# Patient Record
Sex: MALE | Race: WHITE | NOT HISPANIC OR LATINO | Employment: FULL TIME | URBAN - METROPOLITAN AREA
[De-identification: names, ages, dates, MRNs, and addresses within clinical notes are randomized per-mention and may not be internally consistent; named-entity substitution may affect disease eponyms.]

---

## 2017-02-04 ENCOUNTER — APPOINTMENT (EMERGENCY)
Dept: CT IMAGING | Facility: HOSPITAL | Age: 57
End: 2017-02-04
Payer: COMMERCIAL

## 2017-02-04 ENCOUNTER — HOSPITAL ENCOUNTER (EMERGENCY)
Facility: HOSPITAL | Age: 57
Discharge: HOME | End: 2017-02-04
Admitting: EMERGENCY MEDICINE
Payer: COMMERCIAL

## 2017-02-04 VITALS
WEIGHT: 213 LBS | OXYGEN SATURATION: 95 % | TEMPERATURE: 97.9 F | HEART RATE: 70 BPM | SYSTOLIC BLOOD PRESSURE: 120 MMHG | DIASTOLIC BLOOD PRESSURE: 72 MMHG | HEIGHT: 70 IN | RESPIRATION RATE: 18 BRPM | BODY MASS INDEX: 30.49 KG/M2

## 2017-02-04 DIAGNOSIS — R10.9 ABDOMINAL PAIN: Primary | ICD-10-CM

## 2017-02-04 LAB
ALBUMIN SERPL BCP-MCNC: 4.3 G/DL (ref 3.5–5)
ALP SERPL-CCNC: 55 U/L (ref 46–116)
ALT SERPL W P-5'-P-CCNC: 36 U/L (ref 12–78)
ANION GAP SERPL CALCULATED.3IONS-SCNC: 12 MMOL/L (ref 4–13)
AST SERPL W P-5'-P-CCNC: 19 U/L (ref 5–45)
BASOPHILS # BLD AUTO: 0.04 THOUSANDS/ΜL (ref 0–0.1)
BASOPHILS NFR BLD AUTO: 0 % (ref 0–1)
BILIRUB SERPL-MCNC: 1.2 MG/DL (ref 0.2–1)
BUN SERPL-MCNC: 16 MG/DL (ref 5–25)
CALCIUM SERPL-MCNC: 9.5 MG/DL (ref 8.3–10.1)
CHLORIDE SERPL-SCNC: 97 MMOL/L (ref 100–108)
CO2 SERPL-SCNC: 30 MMOL/L (ref 21–32)
CREAT SERPL-MCNC: 0.96 MG/DL (ref 0.6–1.3)
EOSINOPHIL # BLD AUTO: 0.01 THOUSAND/ΜL (ref 0–0.61)
EOSINOPHIL NFR BLD AUTO: 0 % (ref 0–6)
ERYTHROCYTE [DISTWIDTH] IN BLOOD BY AUTOMATED COUNT: 11.8 % (ref 11.6–15.1)
FLUAV AG SPEC QL IA: NEGATIVE
FLUBV AG SPEC QL IA: NEGATIVE
GFR SERPL CREATININE-BSD FRML MDRD: >60 ML/MIN/1.73SQ M
GLUCOSE SERPL-MCNC: 127 MG/DL (ref 65–140)
HCT VFR BLD AUTO: 46.6 % (ref 36.5–49.3)
HGB BLD-MCNC: 17.3 G/DL (ref 12–17)
LIPASE SERPL-CCNC: 185 U/L (ref 73–393)
LYMPHOCYTES # BLD AUTO: 1.16 THOUSANDS/ΜL (ref 0.6–4.47)
LYMPHOCYTES NFR BLD AUTO: 10 % (ref 14–44)
MCH RBC QN AUTO: 31.1 PG (ref 26.8–34.3)
MCHC RBC AUTO-ENTMCNC: 37.1 G/DL (ref 31.4–37.4)
MCV RBC AUTO: 84 FL (ref 82–98)
MONOCYTES # BLD AUTO: 0.51 THOUSAND/ΜL (ref 0.17–1.22)
MONOCYTES NFR BLD AUTO: 4 % (ref 4–12)
NEUTROPHILS # BLD AUTO: 10.03 THOUSANDS/ΜL (ref 1.85–7.62)
NEUTS SEG NFR BLD AUTO: 85 % (ref 43–75)
NRBC BLD AUTO-RTO: 0 /100 WBCS
PLATELET # BLD AUTO: 258 THOUSANDS/UL (ref 149–390)
PMV BLD AUTO: 9.9 FL (ref 8.9–12.7)
POTASSIUM SERPL-SCNC: 2.6 MMOL/L (ref 3.5–5.3)
PROT SERPL-MCNC: 7.5 G/DL (ref 6.4–8.2)
RBC # BLD AUTO: 5.56 MILLION/UL (ref 3.88–5.62)
SODIUM SERPL-SCNC: 139 MMOL/L (ref 136–145)
WBC # BLD AUTO: 11.79 THOUSAND/UL (ref 4.31–10.16)

## 2017-02-04 PROCEDURE — 83690 ASSAY OF LIPASE: CPT | Performed by: PHYSICIAN ASSISTANT

## 2017-02-04 PROCEDURE — 96374 THER/PROPH/DIAG INJ IV PUSH: CPT

## 2017-02-04 PROCEDURE — 80053 COMPREHEN METABOLIC PANEL: CPT | Performed by: PHYSICIAN ASSISTANT

## 2017-02-04 PROCEDURE — 87798 DETECT AGENT NOS DNA AMP: CPT | Performed by: PHYSICIAN ASSISTANT

## 2017-02-04 PROCEDURE — A9270 NON-COVERED ITEM OR SERVICE: HCPCS | Performed by: PHYSICIAN ASSISTANT

## 2017-02-04 PROCEDURE — 87400 INFLUENZA A/B EACH AG IA: CPT | Performed by: PHYSICIAN ASSISTANT

## 2017-02-04 PROCEDURE — 96361 HYDRATE IV INFUSION ADD-ON: CPT

## 2017-02-04 PROCEDURE — 99284 EMERGENCY DEPT VISIT MOD MDM: CPT

## 2017-02-04 PROCEDURE — 36415 COLL VENOUS BLD VENIPUNCTURE: CPT | Performed by: PHYSICIAN ASSISTANT

## 2017-02-04 PROCEDURE — 93005 ELECTROCARDIOGRAM TRACING: CPT | Performed by: PHYSICIAN ASSISTANT

## 2017-02-04 PROCEDURE — 85025 COMPLETE CBC W/AUTO DIFF WBC: CPT | Performed by: PHYSICIAN ASSISTANT

## 2017-02-04 PROCEDURE — 74177 CT ABD & PELVIS W/CONTRAST: CPT

## 2017-02-04 PROCEDURE — 96375 TX/PRO/DX INJ NEW DRUG ADDON: CPT

## 2017-02-04 RX ORDER — ONDANSETRON 4 MG/1
4 TABLET, ORALLY DISINTEGRATING ORAL EVERY 8 HOURS PRN
Qty: 20 TABLET | Refills: 0 | Status: SHIPPED | OUTPATIENT
Start: 2017-02-04 | End: 2017-12-26

## 2017-02-04 RX ORDER — POTASSIUM CHLORIDE 20 MEQ/1
40 TABLET, EXTENDED RELEASE ORAL ONCE
Status: COMPLETED | OUTPATIENT
Start: 2017-02-04 | End: 2017-02-04

## 2017-02-04 RX ORDER — ALPRAZOLAM 0.5 MG/1
0.5 TABLET ORAL
COMMUNITY
End: 2019-09-12 | Stop reason: SDUPTHER

## 2017-02-04 RX ORDER — POTASSIUM CHLORIDE 20 MEQ/1
20 TABLET, EXTENDED RELEASE ORAL DAILY
Qty: 5 TABLET | Refills: 0 | Status: SHIPPED | OUTPATIENT
Start: 2017-02-04 | End: 2017-06-15 | Stop reason: ALTCHOICE

## 2017-02-04 RX ORDER — LANSOPRAZOLE 30 MG/1
30 CAPSULE, DELAYED RELEASE ORAL DAILY
COMMUNITY
End: 2017-06-15 | Stop reason: ALTCHOICE

## 2017-02-04 RX ORDER — LORAZEPAM 2 MG/ML
1 INJECTION INTRAMUSCULAR ONCE
Status: COMPLETED | OUTPATIENT
Start: 2017-02-04 | End: 2017-02-04

## 2017-02-04 RX ORDER — LORATADINE 10 MG/1
10 TABLET ORAL DAILY
COMMUNITY
End: 2017-06-15 | Stop reason: ALTCHOICE

## 2017-02-04 RX ORDER — KETOROLAC TROMETHAMINE 30 MG/ML
30 INJECTION, SOLUTION INTRAMUSCULAR; INTRAVENOUS ONCE
Status: COMPLETED | OUTPATIENT
Start: 2017-02-04 | End: 2017-02-04

## 2017-02-04 RX ORDER — VALSARTAN AND HYDROCHLOROTHIAZIDE 160; 25 MG/1; MG/1
1 TABLET ORAL DAILY
Status: ON HOLD | COMMUNITY
End: 2018-09-19 | Stop reason: ALTCHOICE

## 2017-02-04 RX ORDER — ONDANSETRON 2 MG/ML
4 INJECTION INTRAMUSCULAR; INTRAVENOUS ONCE
Status: COMPLETED | OUTPATIENT
Start: 2017-02-04 | End: 2017-02-04

## 2017-02-04 RX ADMIN — KETOROLAC TROMETHAMINE 30 MG: 30 INJECTION, SOLUTION INTRAMUSCULAR at 17:59

## 2017-02-04 RX ADMIN — IOHEXOL 100 ML: 350 INJECTION, SOLUTION INTRAVENOUS at 18:42

## 2017-02-04 RX ADMIN — LORAZEPAM 1 MG: 2 INJECTION INTRAMUSCULAR; INTRAVENOUS at 17:59

## 2017-02-04 RX ADMIN — SODIUM CHLORIDE 1000 ML: 0.9 INJECTION, SOLUTION INTRAVENOUS at 17:57

## 2017-02-04 RX ADMIN — POTASSIUM CHLORIDE 40 MEQ: 1500 TABLET, EXTENDED RELEASE ORAL at 19:20

## 2017-02-04 RX ADMIN — Medication 400 MG: at 19:20

## 2017-02-04 RX ADMIN — ONDANSETRON 4 MG: 2 INJECTION INTRAMUSCULAR; INTRAVENOUS at 17:59

## 2017-02-05 LAB
FLUAV AG SPEC QL: NORMAL
FLUBV AG SPEC QL: NORMAL
RSV B RNA SPEC QL NAA+PROBE: NORMAL

## 2017-02-06 LAB
ATRIAL RATE: 59 BPM
P AXIS: 51 DEGREES
PR INTERVAL: 158 MS
QRS AXIS: 22 DEGREES
QRSD INTERVAL: 96 MS
QT INTERVAL: 432 MS
QTC INTERVAL: 427 MS
T WAVE AXIS: 40 DEGREES
VENTRICULAR RATE: 59 BPM

## 2017-02-15 ENCOUNTER — ALLSCRIPTS OFFICE VISIT (OUTPATIENT)
Dept: OTHER | Facility: OTHER | Age: 57
End: 2017-02-15

## 2017-02-28 ENCOUNTER — ANESTHESIA EVENT (OUTPATIENT)
Dept: PERIOP | Facility: HOSPITAL | Age: 57
End: 2017-02-28
Payer: COMMERCIAL

## 2017-03-01 ENCOUNTER — HOSPITAL ENCOUNTER (OUTPATIENT)
Facility: HOSPITAL | Age: 57
Setting detail: OUTPATIENT SURGERY
Discharge: HOME | End: 2017-03-01
Attending: INTERNAL MEDICINE | Admitting: INTERNAL MEDICINE
Payer: COMMERCIAL

## 2017-03-01 ENCOUNTER — ANESTHESIA (OUTPATIENT)
Dept: PERIOP | Facility: HOSPITAL | Age: 57
End: 2017-03-01
Payer: COMMERCIAL

## 2017-03-01 ENCOUNTER — GENERIC CONVERSION - ENCOUNTER (OUTPATIENT)
Dept: OTHER | Facility: OTHER | Age: 57
End: 2017-03-01

## 2017-03-01 VITALS
WEIGHT: 216.93 LBS | DIASTOLIC BLOOD PRESSURE: 73 MMHG | OXYGEN SATURATION: 97 % | HEART RATE: 56 BPM | BODY MASS INDEX: 31.06 KG/M2 | HEIGHT: 70 IN | SYSTOLIC BLOOD PRESSURE: 131 MMHG | RESPIRATION RATE: 20 BRPM | TEMPERATURE: 98.4 F

## 2017-03-01 DIAGNOSIS — R11.0 NAUSEA: ICD-10-CM

## 2017-03-01 DIAGNOSIS — R10.84 GENERALIZED ABDOMINAL PAIN: ICD-10-CM

## 2017-03-01 PROCEDURE — 88305 TISSUE EXAM BY PATHOLOGIST: CPT | Performed by: INTERNAL MEDICINE

## 2017-03-01 PROCEDURE — 88342 IMHCHEM/IMCYTCHM 1ST ANTB: CPT | Performed by: INTERNAL MEDICINE

## 2017-03-01 RX ORDER — PROPOFOL 10 MG/ML
INJECTION, EMULSION INTRAVENOUS AS NEEDED
Status: DISCONTINUED | OUTPATIENT
Start: 2017-03-01 | End: 2017-03-01 | Stop reason: SURG

## 2017-03-01 RX ORDER — SODIUM CHLORIDE, SODIUM LACTATE, POTASSIUM CHLORIDE, CALCIUM CHLORIDE 600; 310; 30; 20 MG/100ML; MG/100ML; MG/100ML; MG/100ML
50 INJECTION, SOLUTION INTRAVENOUS CONTINUOUS
Status: DISCONTINUED | OUTPATIENT
Start: 2017-03-01 | End: 2017-03-01 | Stop reason: HOSPADM

## 2017-03-01 RX ADMIN — SODIUM CHLORIDE, SODIUM LACTATE, POTASSIUM CHLORIDE, AND CALCIUM CHLORIDE 50 ML/HR: .6; .31; .03; .02 INJECTION, SOLUTION INTRAVENOUS at 10:26

## 2017-03-01 RX ADMIN — PROPOFOL 120 MG: 10 INJECTION, EMULSION INTRAVENOUS at 11:12

## 2017-03-01 RX ADMIN — PROPOFOL 50 MG: 10 INJECTION, EMULSION INTRAVENOUS at 11:20

## 2017-03-01 RX ADMIN — PROPOFOL 30 MG: 10 INJECTION, EMULSION INTRAVENOUS at 11:15

## 2017-03-04 ENCOUNTER — GENERIC CONVERSION - ENCOUNTER (OUTPATIENT)
Dept: OTHER | Facility: OTHER | Age: 57
End: 2017-03-04

## 2017-06-15 ENCOUNTER — HOSPITAL ENCOUNTER (OUTPATIENT)
Facility: HOSPITAL | Age: 57
Setting detail: OBSERVATION
Discharge: HOME | End: 2017-06-16
Attending: EMERGENCY MEDICINE | Admitting: FAMILY MEDICINE
Payer: COMMERCIAL

## 2017-06-15 DIAGNOSIS — R11.0 CHRONIC NAUSEA: Primary | ICD-10-CM

## 2017-06-15 DIAGNOSIS — R11.2 INTRACTABLE NAUSEA AND VOMITING: ICD-10-CM

## 2017-06-15 PROBLEM — R11.10 VOMITING: Status: ACTIVE | Noted: 2017-06-15

## 2017-06-15 PROBLEM — R19.7 DIARRHEA: Status: ACTIVE | Noted: 2017-06-15

## 2017-06-15 PROBLEM — D72.829 LEUKOCYTOSIS: Status: ACTIVE | Noted: 2017-06-15

## 2017-06-15 LAB
ALBUMIN SERPL BCP-MCNC: 4.3 G/DL (ref 3.5–5)
ALP SERPL-CCNC: 52 U/L (ref 46–116)
ALT SERPL W P-5'-P-CCNC: 29 U/L (ref 12–78)
ANION GAP SERPL CALCULATED.3IONS-SCNC: 12 MMOL/L (ref 4–13)
AST SERPL W P-5'-P-CCNC: 22 U/L (ref 5–45)
ATRIAL RATE: 57 BPM
BASOPHILS # BLD MANUAL: 0 THOUSAND/UL (ref 0–0.1)
BASOPHILS NFR MAR MANUAL: 0 % (ref 0–1)
BILIRUB SERPL-MCNC: 0.8 MG/DL (ref 0.2–1)
BUN SERPL-MCNC: 14 MG/DL (ref 5–25)
CALCIUM SERPL-MCNC: 9.4 MG/DL (ref 8.3–10.1)
CHLORIDE SERPL-SCNC: 101 MMOL/L (ref 100–108)
CO2 SERPL-SCNC: 26 MMOL/L (ref 21–32)
CREAT SERPL-MCNC: 0.84 MG/DL (ref 0.6–1.3)
EOSINOPHIL # BLD MANUAL: 0 THOUSAND/UL (ref 0–0.4)
EOSINOPHIL NFR BLD MANUAL: 0 % (ref 0–6)
ERYTHROCYTE [DISTWIDTH] IN BLOOD BY AUTOMATED COUNT: 12 % (ref 11.6–15.1)
GFR SERPL CREATININE-BSD FRML MDRD: >60 ML/MIN/1.73SQ M
GLUCOSE SERPL-MCNC: 125 MG/DL (ref 65–140)
HCT VFR BLD AUTO: 45.8 % (ref 36.5–49.3)
HGB BLD-MCNC: 16.8 G/DL (ref 12–17)
LIPASE SERPL-CCNC: 172 U/L (ref 73–393)
LYMPHOCYTES # BLD AUTO: 0.45 THOUSAND/UL (ref 0.6–4.47)
LYMPHOCYTES # BLD AUTO: 3 % (ref 14–44)
MCH RBC QN AUTO: 31.5 PG (ref 26.8–34.3)
MCHC RBC AUTO-ENTMCNC: 36.7 G/DL (ref 31.4–37.4)
MCV RBC AUTO: 86 FL (ref 82–98)
MONOCYTES # BLD AUTO: 0.61 THOUSAND/UL (ref 0–1.22)
MONOCYTES NFR BLD: 4 % (ref 4–12)
NEUTROPHILS # BLD MANUAL: 14.08 THOUSAND/UL (ref 1.85–7.62)
NEUTS SEG NFR BLD AUTO: 93 % (ref 43–75)
NRBC BLD AUTO-RTO: 0 /100 WBCS
P AXIS: 53 DEGREES
PLATELET # BLD AUTO: 226 THOUSANDS/UL (ref 149–390)
PLATELET BLD QL SMEAR: ADEQUATE
PMV BLD AUTO: 10.5 FL (ref 8.9–12.7)
POTASSIUM SERPL-SCNC: 3.4 MMOL/L (ref 3.5–5.3)
PR INTERVAL: 170 MS
PROT SERPL-MCNC: 7.5 G/DL (ref 6.4–8.2)
QRS AXIS: 60 DEGREES
QRSD INTERVAL: 92 MS
QT INTERVAL: 456 MS
QTC INTERVAL: 443 MS
RBC # BLD AUTO: 5.34 MILLION/UL (ref 3.88–5.62)
SODIUM SERPL-SCNC: 139 MMOL/L (ref 136–145)
T WAVE AXIS: 43 DEGREES
TOTAL CELLS COUNTED SPEC: 100
TROPONIN I SERPL-MCNC: <0.02 NG/ML
VENTRICULAR RATE: 57 BPM
WBC # BLD AUTO: 15.14 THOUSAND/UL (ref 4.31–10.16)

## 2017-06-15 PROCEDURE — 85007 BL SMEAR W/DIFF WBC COUNT: CPT | Performed by: EMERGENCY MEDICINE

## 2017-06-15 PROCEDURE — 83690 ASSAY OF LIPASE: CPT | Performed by: EMERGENCY MEDICINE

## 2017-06-15 PROCEDURE — 96374 THER/PROPH/DIAG INJ IV PUSH: CPT

## 2017-06-15 PROCEDURE — 99285 EMERGENCY DEPT VISIT HI MDM: CPT

## 2017-06-15 PROCEDURE — 85027 COMPLETE CBC AUTOMATED: CPT | Performed by: EMERGENCY MEDICINE

## 2017-06-15 PROCEDURE — 83735 ASSAY OF MAGNESIUM: CPT | Performed by: NURSE PRACTITIONER

## 2017-06-15 PROCEDURE — 96361 HYDRATE IV INFUSION ADD-ON: CPT

## 2017-06-15 PROCEDURE — 84484 ASSAY OF TROPONIN QUANT: CPT | Performed by: EMERGENCY MEDICINE

## 2017-06-15 PROCEDURE — 93005 ELECTROCARDIOGRAM TRACING: CPT | Performed by: EMERGENCY MEDICINE

## 2017-06-15 PROCEDURE — 36415 COLL VENOUS BLD VENIPUNCTURE: CPT | Performed by: EMERGENCY MEDICINE

## 2017-06-15 PROCEDURE — C9113 INJ PANTOPRAZOLE SODIUM, VIA: HCPCS | Performed by: NURSE PRACTITIONER

## 2017-06-15 PROCEDURE — 96372 THER/PROPH/DIAG INJ SC/IM: CPT

## 2017-06-15 PROCEDURE — 80053 COMPREHEN METABOLIC PANEL: CPT | Performed by: EMERGENCY MEDICINE

## 2017-06-15 RX ORDER — ONDANSETRON 2 MG/ML
4 INJECTION INTRAMUSCULAR; INTRAVENOUS ONCE
Status: COMPLETED | OUTPATIENT
Start: 2017-06-15 | End: 2017-06-15

## 2017-06-15 RX ORDER — RANITIDINE 150 MG/1
150 CAPSULE ORAL DAILY
Qty: 30 CAPSULE | Refills: 0 | Status: SHIPPED | OUTPATIENT
Start: 2017-06-15 | End: 2017-06-16 | Stop reason: HOSPADM

## 2017-06-15 RX ORDER — ONDANSETRON 4 MG/1
4 TABLET, FILM COATED ORAL EVERY 6 HOURS
Qty: 12 TABLET | Refills: 0 | Status: SHIPPED | OUTPATIENT
Start: 2017-06-15 | End: 2017-12-26

## 2017-06-15 RX ORDER — METOCLOPRAMIDE HYDROCHLORIDE 5 MG/ML
10 INJECTION INTRAMUSCULAR; INTRAVENOUS ONCE
Status: DISCONTINUED | OUTPATIENT
Start: 2017-06-15 | End: 2017-06-15 | Stop reason: ALTCHOICE

## 2017-06-15 RX ORDER — ACETAMINOPHEN 325 MG/1
650 TABLET ORAL EVERY 6 HOURS PRN
Status: DISCONTINUED | OUTPATIENT
Start: 2017-06-15 | End: 2017-06-16 | Stop reason: HOSPADM

## 2017-06-15 RX ORDER — SUCRALFATE ORAL 1 G/10ML
1000 SUSPENSION ORAL EVERY 6 HOURS SCHEDULED
Status: DISCONTINUED | OUTPATIENT
Start: 2017-06-15 | End: 2017-06-16 | Stop reason: HOSPADM

## 2017-06-15 RX ORDER — SUCRALFATE ORAL 1 G/10ML
1 SUSPENSION ORAL 4 TIMES DAILY
Qty: 420 ML | Refills: 0 | Status: SHIPPED | OUTPATIENT
Start: 2017-06-15 | End: 2017-06-16 | Stop reason: HOSPADM

## 2017-06-15 RX ORDER — ONDANSETRON HYDROCHLORIDE 4 MG/5ML
4 SOLUTION ORAL ONCE
Status: DISCONTINUED | OUTPATIENT
Start: 2017-06-15 | End: 2017-06-15

## 2017-06-15 RX ORDER — ALPRAZOLAM 0.5 MG/1
0.5 TABLET ORAL
Status: DISCONTINUED | OUTPATIENT
Start: 2017-06-15 | End: 2017-06-16 | Stop reason: HOSPADM

## 2017-06-15 RX ORDER — ONDANSETRON 2 MG/ML
4 INJECTION INTRAMUSCULAR; INTRAVENOUS EVERY 6 HOURS PRN
Status: DISCONTINUED | OUTPATIENT
Start: 2017-06-15 | End: 2017-06-16 | Stop reason: HOSPADM

## 2017-06-15 RX ORDER — LORATADINE 10 MG/1
10 TABLET ORAL DAILY
Status: DISCONTINUED | OUTPATIENT
Start: 2017-06-16 | End: 2017-06-16 | Stop reason: HOSPADM

## 2017-06-15 RX ORDER — MAGNESIUM HYDROXIDE/ALUMINUM HYDROXICE/SIMETHICONE 120; 1200; 1200 MG/30ML; MG/30ML; MG/30ML
30 SUSPENSION ORAL ONCE
Status: COMPLETED | OUTPATIENT
Start: 2017-06-15 | End: 2017-06-15

## 2017-06-15 RX ORDER — ONDANSETRON 2 MG/ML
4 INJECTION INTRAMUSCULAR; INTRAVENOUS ONCE
Status: DISCONTINUED | OUTPATIENT
Start: 2017-06-15 | End: 2017-06-16 | Stop reason: HOSPADM

## 2017-06-15 RX ORDER — PANTOPRAZOLE SODIUM 40 MG/1
40 INJECTION, POWDER, FOR SOLUTION INTRAVENOUS ONCE
Status: COMPLETED | OUTPATIENT
Start: 2017-06-15 | End: 2017-06-15

## 2017-06-15 RX ORDER — PROMETHAZINE HYDROCHLORIDE 25 MG/ML
25 INJECTION, SOLUTION INTRAMUSCULAR; INTRAVENOUS ONCE
Status: COMPLETED | OUTPATIENT
Start: 2017-06-15 | End: 2017-06-15

## 2017-06-15 RX ORDER — LEVOCETIRIZINE DIHYDROCHLORIDE 5 MG/1
5 TABLET, FILM COATED ORAL EVERY EVENING
COMMUNITY
End: 2017-12-26

## 2017-06-15 RX ORDER — SODIUM CHLORIDE 9 MG/ML
125 INJECTION, SOLUTION INTRAVENOUS CONTINUOUS
Status: DISPENSED | OUTPATIENT
Start: 2017-06-15 | End: 2017-06-16

## 2017-06-15 RX ORDER — OLANZAPINE 10 MG/1
10 INJECTION, POWDER, LYOPHILIZED, FOR SOLUTION INTRAMUSCULAR ONCE
Status: COMPLETED | OUTPATIENT
Start: 2017-06-15 | End: 2017-06-15

## 2017-06-15 RX ADMIN — PROMETHAZINE HYDROCHLORIDE 25 MG: 25 INJECTION INTRAMUSCULAR; INTRAVENOUS at 15:07

## 2017-06-15 RX ADMIN — OLANZAPINE 10 MG: 10 INJECTION, POWDER, LYOPHILIZED, FOR SOLUTION INTRAMUSCULAR at 12:27

## 2017-06-15 RX ADMIN — ALUMINUM HYDROXIDE, MAGNESIUM HYDROXIDE, AND SIMETHICONE 30 ML: 200; 200; 20 SUSPENSION ORAL at 11:59

## 2017-06-15 RX ADMIN — METOCLOPRAMIDE: 5 INJECTION, SOLUTION INTRAMUSCULAR; INTRAVENOUS at 17:22

## 2017-06-15 RX ADMIN — SODIUM CHLORIDE 1000 ML: 0.9 INJECTION, SOLUTION INTRAVENOUS at 11:52

## 2017-06-15 RX ADMIN — SODIUM CHLORIDE 125 ML/HR: 0.9 INJECTION, SOLUTION INTRAVENOUS at 17:38

## 2017-06-15 RX ADMIN — WATER 10 ML: 1 INJECTION INTRAMUSCULAR; INTRAVENOUS; SUBCUTANEOUS at 12:27

## 2017-06-15 RX ADMIN — SUCRALFATE 1000 MG: 1 SUSPENSION ORAL at 17:22

## 2017-06-15 RX ADMIN — ONDANSETRON 4 MG: 2 INJECTION INTRAMUSCULAR; INTRAVENOUS at 11:53

## 2017-06-15 RX ADMIN — SODIUM CHLORIDE 1000 ML: 0.9 INJECTION, SOLUTION INTRAVENOUS at 14:19

## 2017-06-15 RX ADMIN — ONDANSETRON 4 MG: 2 INJECTION INTRAMUSCULAR; INTRAVENOUS at 15:43

## 2017-06-15 RX ADMIN — PANTOPRAZOLE SODIUM 40 MG: 40 INJECTION, POWDER, FOR SOLUTION INTRAVENOUS at 17:22

## 2017-06-15 RX ADMIN — LIDOCAINE HYDROCHLORIDE 15 ML: 20 SOLUTION ORAL; TOPICAL at 11:59

## 2017-06-15 RX ADMIN — SUCRALFATE 1000 MG: 1 SUSPENSION ORAL at 23:54

## 2017-06-15 RX ADMIN — ALPRAZOLAM 0.5 MG: 0.5 TABLET ORAL at 17:22

## 2017-06-16 VITALS
DIASTOLIC BLOOD PRESSURE: 87 MMHG | SYSTOLIC BLOOD PRESSURE: 124 MMHG | HEART RATE: 55 BPM | RESPIRATION RATE: 18 BRPM | BODY MASS INDEX: 30.08 KG/M2 | OXYGEN SATURATION: 98 % | TEMPERATURE: 98.3 F | HEIGHT: 70 IN | WEIGHT: 210.1 LBS

## 2017-06-16 PROBLEM — R19.7 DIARRHEA: Status: RESOLVED | Noted: 2017-06-15 | Resolved: 2017-06-16

## 2017-06-16 PROBLEM — R11.2 INTRACTABLE NAUSEA AND VOMITING: Status: RESOLVED | Noted: 2017-06-15 | Resolved: 2017-06-16

## 2017-06-16 PROBLEM — E87.6 HYPOKALEMIA: Status: ACTIVE | Noted: 2017-06-16

## 2017-06-16 PROBLEM — D72.829 LEUKOCYTOSIS: Status: RESOLVED | Noted: 2017-06-15 | Resolved: 2017-06-16

## 2017-06-16 LAB
ANION GAP SERPL CALCULATED.3IONS-SCNC: 11 MMOL/L (ref 4–13)
ANION GAP SERPL CALCULATED.3IONS-SCNC: 8 MMOL/L (ref 4–13)
BACTERIA UR QL AUTO: ABNORMAL /HPF
BASOPHILS # BLD AUTO: 0.03 THOUSANDS/ΜL (ref 0–0.1)
BASOPHILS NFR BLD AUTO: 0 % (ref 0–1)
BILIRUB UR QL STRIP: NEGATIVE
BUN SERPL-MCNC: 10 MG/DL (ref 5–25)
BUN SERPL-MCNC: 12 MG/DL (ref 5–25)
C DIFF TOX GENS STL QL NAA+PROBE: NORMAL
CALCIUM SERPL-MCNC: 8.1 MG/DL (ref 8.3–10.1)
CALCIUM SERPL-MCNC: 8.8 MG/DL (ref 8.3–10.1)
CHLORIDE SERPL-SCNC: 101 MMOL/L (ref 100–108)
CHLORIDE SERPL-SCNC: 104 MMOL/L (ref 100–108)
CLARITY UR: CLEAR
CO2 SERPL-SCNC: 26 MMOL/L (ref 21–32)
CO2 SERPL-SCNC: 32 MMOL/L (ref 21–32)
COLOR UR: YELLOW
CREAT SERPL-MCNC: 0.86 MG/DL (ref 0.6–1.3)
CREAT SERPL-MCNC: 1.02 MG/DL (ref 0.6–1.3)
EOSINOPHIL # BLD AUTO: 0.08 THOUSAND/ΜL (ref 0–0.61)
EOSINOPHIL NFR BLD AUTO: 1 % (ref 0–6)
ERYTHROCYTE [DISTWIDTH] IN BLOOD BY AUTOMATED COUNT: 12.5 % (ref 11.6–15.1)
GFR SERPL CREATININE-BSD FRML MDRD: >60 ML/MIN/1.73SQ M
GFR SERPL CREATININE-BSD FRML MDRD: >60 ML/MIN/1.73SQ M
GLUCOSE P FAST SERPL-MCNC: 105 MG/DL (ref 65–99)
GLUCOSE P FAST SERPL-MCNC: 143 MG/DL (ref 65–99)
GLUCOSE SERPL-MCNC: 105 MG/DL (ref 65–140)
GLUCOSE SERPL-MCNC: 143 MG/DL (ref 65–140)
GLUCOSE UR STRIP-MCNC: NEGATIVE MG/DL
HCT VFR BLD AUTO: 42 % (ref 36.5–49.3)
HGB BLD-MCNC: 14.9 G/DL (ref 12–17)
HGB UR QL STRIP.AUTO: ABNORMAL
KETONES UR STRIP-MCNC: NEGATIVE MG/DL
LEUKOCYTE ESTERASE UR QL STRIP: NEGATIVE
LYMPHOCYTES # BLD AUTO: 1.74 THOUSANDS/ΜL (ref 0.6–4.47)
LYMPHOCYTES NFR BLD AUTO: 15 % (ref 14–44)
MAGNESIUM SERPL-MCNC: 1.7 MG/DL (ref 1.6–2.6)
MAGNESIUM SERPL-MCNC: 1.8 MG/DL (ref 1.6–2.6)
MAGNESIUM SERPL-MCNC: 1.8 MG/DL (ref 1.6–2.6)
MCH RBC QN AUTO: 30.8 PG (ref 26.8–34.3)
MCHC RBC AUTO-ENTMCNC: 35.5 G/DL (ref 31.4–37.4)
MCV RBC AUTO: 87 FL (ref 82–98)
MONOCYTES # BLD AUTO: 0.78 THOUSAND/ΜL (ref 0.17–1.22)
MONOCYTES NFR BLD AUTO: 7 % (ref 4–12)
NEUTROPHILS # BLD AUTO: 9.29 THOUSANDS/ΜL (ref 1.85–7.62)
NEUTS SEG NFR BLD AUTO: 78 % (ref 43–75)
NITRITE UR QL STRIP: NEGATIVE
NON-SQ EPI CELLS URNS QL MICRO: ABNORMAL /HPF
NRBC BLD AUTO-RTO: 0 /100 WBCS
PH UR STRIP.AUTO: 6 [PH] (ref 4.5–8)
PLATELET # BLD AUTO: 202 THOUSANDS/UL (ref 149–390)
PLATELET # BLD AUTO: 210 THOUSANDS/UL (ref 149–390)
PMV BLD AUTO: 10.3 FL (ref 8.9–12.7)
PMV BLD AUTO: 10.6 FL (ref 8.9–12.7)
POTASSIUM SERPL-SCNC: 2.9 MMOL/L (ref 3.5–5.3)
POTASSIUM SERPL-SCNC: 3 MMOL/L (ref 3.5–5.3)
PROT UR STRIP-MCNC: NEGATIVE MG/DL
RBC # BLD AUTO: 4.83 MILLION/UL (ref 3.88–5.62)
RBC #/AREA URNS AUTO: ABNORMAL /HPF
SODIUM SERPL-SCNC: 141 MMOL/L (ref 136–145)
SODIUM SERPL-SCNC: 141 MMOL/L (ref 136–145)
SP GR UR STRIP.AUTO: 1.02 (ref 1–1.03)
UROBILINOGEN UR QL STRIP.AUTO: 0.2 E.U./DL
WBC # BLD AUTO: 11.96 THOUSAND/UL (ref 4.31–10.16)
WBC #/AREA URNS AUTO: ABNORMAL /HPF

## 2017-06-16 PROCEDURE — 83735 ASSAY OF MAGNESIUM: CPT | Performed by: NURSE PRACTITIONER

## 2017-06-16 PROCEDURE — 85025 COMPLETE CBC W/AUTO DIFF WBC: CPT | Performed by: NURSE PRACTITIONER

## 2017-06-16 PROCEDURE — 81001 URINALYSIS AUTO W/SCOPE: CPT | Performed by: NURSE PRACTITIONER

## 2017-06-16 PROCEDURE — 87015 SPECIMEN INFECT AGNT CONCNTJ: CPT | Performed by: NURSE PRACTITIONER

## 2017-06-16 PROCEDURE — 85049 AUTOMATED PLATELET COUNT: CPT | Performed by: NURSE PRACTITIONER

## 2017-06-16 PROCEDURE — 87209 SMEAR COMPLEX STAIN: CPT | Performed by: NURSE PRACTITIONER

## 2017-06-16 PROCEDURE — 87177 OVA AND PARASITES SMEARS: CPT | Performed by: NURSE PRACTITIONER

## 2017-06-16 PROCEDURE — 87899 AGENT NOS ASSAY W/OPTIC: CPT | Performed by: NURSE PRACTITIONER

## 2017-06-16 PROCEDURE — 87493 C DIFF AMPLIFIED PROBE: CPT | Performed by: NURSE PRACTITIONER

## 2017-06-16 PROCEDURE — 87086 URINE CULTURE/COLONY COUNT: CPT | Performed by: NURSE PRACTITIONER

## 2017-06-16 PROCEDURE — 80048 BASIC METABOLIC PNL TOTAL CA: CPT | Performed by: NURSE PRACTITIONER

## 2017-06-16 PROCEDURE — 87045 FECES CULTURE AEROBIC BACT: CPT | Performed by: NURSE PRACTITIONER

## 2017-06-16 PROCEDURE — 87046 STOOL CULTR AEROBIC BACT EA: CPT | Performed by: NURSE PRACTITIONER

## 2017-06-16 RX ORDER — PANTOPRAZOLE SODIUM 40 MG/1
40 TABLET, DELAYED RELEASE ORAL DAILY
Qty: 30 TABLET | Refills: 0 | Status: SHIPPED | OUTPATIENT
Start: 2017-06-16 | End: 2017-06-16

## 2017-06-16 RX ORDER — SUCRALFATE ORAL 1 G/10ML
1 SUSPENSION ORAL 2 TIMES DAILY
Qty: 420 ML | Refills: 0 | Status: SHIPPED | OUTPATIENT
Start: 2017-06-16 | End: 2017-12-26

## 2017-06-16 RX ORDER — PANTOPRAZOLE SODIUM 40 MG/1
40 TABLET, DELAYED RELEASE ORAL 2 TIMES DAILY
Qty: 30 TABLET | Refills: 0 | Status: SHIPPED | OUTPATIENT
Start: 2017-06-16 | End: 2017-12-26

## 2017-06-16 RX ORDER — POTASSIUM CHLORIDE 14.9 MG/ML
20 INJECTION INTRAVENOUS ONCE
Status: COMPLETED | OUTPATIENT
Start: 2017-06-16 | End: 2017-06-16

## 2017-06-16 RX ORDER — SUCRALFATE ORAL 1 G/10ML
1 SUSPENSION ORAL 2 TIMES DAILY
Qty: 420 ML | Refills: 0 | Status: SHIPPED | OUTPATIENT
Start: 2017-06-16 | End: 2017-06-16

## 2017-06-16 RX ORDER — POTASSIUM CHLORIDE 14.9 MG/ML
20 INJECTION INTRAVENOUS
Status: COMPLETED | OUTPATIENT
Start: 2017-06-16 | End: 2017-06-16

## 2017-06-16 RX ORDER — POTASSIUM CHLORIDE 20 MEQ/1
40 TABLET, EXTENDED RELEASE ORAL ONCE
Status: COMPLETED | OUTPATIENT
Start: 2017-06-16 | End: 2017-06-16

## 2017-06-16 RX ORDER — PANTOPRAZOLE SODIUM 40 MG/1
40 TABLET, DELAYED RELEASE ORAL
Status: DISCONTINUED | OUTPATIENT
Start: 2017-06-16 | End: 2017-06-16 | Stop reason: HOSPADM

## 2017-06-16 RX ORDER — POTASSIUM CHLORIDE 29.8 MG/ML
40 INJECTION INTRAVENOUS ONCE
Status: DISCONTINUED | OUTPATIENT
Start: 2017-06-16 | End: 2017-06-16

## 2017-06-16 RX ADMIN — SODIUM CHLORIDE 125 ML/HR: 0.9 INJECTION, SOLUTION INTRAVENOUS at 01:37

## 2017-06-16 RX ADMIN — LORATADINE 10 MG: 10 TABLET ORAL at 08:30

## 2017-06-16 RX ADMIN — POTASSIUM CHLORIDE 40 MEQ: 1500 TABLET, EXTENDED RELEASE ORAL at 18:24

## 2017-06-16 RX ADMIN — SUCRALFATE 1000 MG: 1 SUSPENSION ORAL at 12:11

## 2017-06-16 RX ADMIN — POTASSIUM CHLORIDE 20 MEQ: 200 INJECTION, SOLUTION INTRAVENOUS at 14:19

## 2017-06-16 RX ADMIN — ALPRAZOLAM 0.5 MG: 0.5 TABLET ORAL at 13:48

## 2017-06-16 RX ADMIN — SODIUM CHLORIDE 125 ML/HR: 0.9 INJECTION, SOLUTION INTRAVENOUS at 10:30

## 2017-06-16 RX ADMIN — PANTOPRAZOLE SODIUM 40 MG: 40 TABLET, DELAYED RELEASE ORAL at 17:17

## 2017-06-16 RX ADMIN — POTASSIUM CHLORIDE 20 MEQ: 200 INJECTION, SOLUTION INTRAVENOUS at 12:12

## 2017-06-16 RX ADMIN — HYDROCHLOROTHIAZIDE: 25 TABLET ORAL at 08:30

## 2017-06-16 RX ADMIN — SUCRALFATE 1000 MG: 1 SUSPENSION ORAL at 17:17

## 2017-06-16 RX ADMIN — POTASSIUM CHLORIDE 20 MEQ: 200 INJECTION, SOLUTION INTRAVENOUS at 18:24

## 2017-06-16 RX ADMIN — ENOXAPARIN SODIUM 40 MG: 40 INJECTION SUBCUTANEOUS at 08:29

## 2017-06-16 RX ADMIN — SUCRALFATE 1000 MG: 1 SUSPENSION ORAL at 05:27

## 2017-06-17 ENCOUNTER — HOSPITAL ENCOUNTER (EMERGENCY)
Facility: HOSPITAL | Age: 57
Discharge: HOME | End: 2017-06-17
Attending: EMERGENCY MEDICINE | Admitting: EMERGENCY MEDICINE
Payer: COMMERCIAL

## 2017-06-17 ENCOUNTER — APPOINTMENT (EMERGENCY)
Dept: CT IMAGING | Facility: HOSPITAL | Age: 57
End: 2017-06-17
Payer: COMMERCIAL

## 2017-06-17 VITALS
TEMPERATURE: 99 F | BODY MASS INDEX: 30.06 KG/M2 | WEIGHT: 210 LBS | HEART RATE: 65 BPM | OXYGEN SATURATION: 98 % | RESPIRATION RATE: 18 BRPM | SYSTOLIC BLOOD PRESSURE: 174 MMHG | DIASTOLIC BLOOD PRESSURE: 78 MMHG | HEIGHT: 70 IN

## 2017-06-17 DIAGNOSIS — R11.2 NAUSEA AND VOMITING: Primary | ICD-10-CM

## 2017-06-17 LAB
ALBUMIN SERPL BCP-MCNC: 4.1 G/DL (ref 3.5–5)
ALP SERPL-CCNC: 51 U/L (ref 46–116)
ALT SERPL W P-5'-P-CCNC: 31 U/L (ref 12–78)
ANION GAP SERPL CALCULATED.3IONS-SCNC: 12 MMOL/L (ref 4–13)
AST SERPL W P-5'-P-CCNC: 33 U/L (ref 5–45)
BACTERIA UR CULT: NORMAL
BASOPHILS # BLD AUTO: 0.03 THOUSANDS/ΜL (ref 0–0.1)
BASOPHILS NFR BLD AUTO: 0 % (ref 0–1)
BILIRUB SERPL-MCNC: 1.2 MG/DL (ref 0.2–1)
BILIRUB UR QL STRIP: NEGATIVE
BUN SERPL-MCNC: 10 MG/DL (ref 5–25)
CALCIUM SERPL-MCNC: 9.6 MG/DL (ref 8.3–10.1)
CHLORIDE SERPL-SCNC: 100 MMOL/L (ref 100–108)
CLARITY UR: CLEAR
CO2 SERPL-SCNC: 26 MMOL/L (ref 21–32)
COLOR UR: YELLOW
CREAT SERPL-MCNC: 0.85 MG/DL (ref 0.6–1.3)
EOSINOPHIL # BLD AUTO: 0.02 THOUSAND/ΜL (ref 0–0.61)
EOSINOPHIL NFR BLD AUTO: 0 % (ref 0–6)
ERYTHROCYTE [DISTWIDTH] IN BLOOD BY AUTOMATED COUNT: 12.3 % (ref 11.6–15.1)
GFR SERPL CREATININE-BSD FRML MDRD: >60 ML/MIN/1.73SQ M
GLUCOSE SERPL-MCNC: 120 MG/DL (ref 65–140)
GLUCOSE UR STRIP-MCNC: NEGATIVE MG/DL
HCT VFR BLD AUTO: 45.8 % (ref 36.5–49.3)
HGB BLD-MCNC: 16.6 G/DL (ref 12–17)
HGB UR QL STRIP.AUTO: NEGATIVE
KETONES UR STRIP-MCNC: NEGATIVE MG/DL
LEUKOCYTE ESTERASE UR QL STRIP: NEGATIVE
LIPASE SERPL-CCNC: 238 U/L (ref 73–393)
LYMPHOCYTES # BLD AUTO: 1.38 THOUSANDS/ΜL (ref 0.6–4.47)
LYMPHOCYTES NFR BLD AUTO: 13 % (ref 14–44)
MCH RBC QN AUTO: 30.7 PG (ref 26.8–34.3)
MCHC RBC AUTO-ENTMCNC: 36.2 G/DL (ref 31.4–37.4)
MCV RBC AUTO: 85 FL (ref 82–98)
MONOCYTES # BLD AUTO: 0.46 THOUSAND/ΜL (ref 0.17–1.22)
MONOCYTES NFR BLD AUTO: 4 % (ref 4–12)
NEUTROPHILS # BLD AUTO: 8.55 THOUSANDS/ΜL (ref 1.85–7.62)
NEUTS SEG NFR BLD AUTO: 83 % (ref 43–75)
NITRITE UR QL STRIP: NEGATIVE
NRBC BLD AUTO-RTO: 0 /100 WBCS
PH UR STRIP.AUTO: 7.5 [PH] (ref 4.5–8)
PLATELET # BLD AUTO: 214 THOUSANDS/UL (ref 149–390)
PMV BLD AUTO: 10.6 FL (ref 8.9–12.7)
POTASSIUM SERPL-SCNC: 3.6 MMOL/L (ref 3.5–5.3)
PROT SERPL-MCNC: 7.5 G/DL (ref 6.4–8.2)
PROT UR STRIP-MCNC: NEGATIVE MG/DL
RBC # BLD AUTO: 5.4 MILLION/UL (ref 3.88–5.62)
SODIUM SERPL-SCNC: 138 MMOL/L (ref 136–145)
SP GR UR STRIP.AUTO: 1.01 (ref 1–1.03)
UROBILINOGEN UR QL STRIP.AUTO: 0.2 E.U./DL
WBC # BLD AUTO: 10.47 THOUSAND/UL (ref 4.31–10.16)

## 2017-06-17 PROCEDURE — 83690 ASSAY OF LIPASE: CPT | Performed by: EMERGENCY MEDICINE

## 2017-06-17 PROCEDURE — 99284 EMERGENCY DEPT VISIT MOD MDM: CPT

## 2017-06-17 PROCEDURE — 96374 THER/PROPH/DIAG INJ IV PUSH: CPT

## 2017-06-17 PROCEDURE — 80053 COMPREHEN METABOLIC PANEL: CPT | Performed by: EMERGENCY MEDICINE

## 2017-06-17 PROCEDURE — 96361 HYDRATE IV INFUSION ADD-ON: CPT

## 2017-06-17 PROCEDURE — 74177 CT ABD & PELVIS W/CONTRAST: CPT

## 2017-06-17 PROCEDURE — 36415 COLL VENOUS BLD VENIPUNCTURE: CPT | Performed by: EMERGENCY MEDICINE

## 2017-06-17 PROCEDURE — 93005 ELECTROCARDIOGRAM TRACING: CPT | Performed by: EMERGENCY MEDICINE

## 2017-06-17 PROCEDURE — 81003 URINALYSIS AUTO W/O SCOPE: CPT | Performed by: EMERGENCY MEDICINE

## 2017-06-17 PROCEDURE — 85025 COMPLETE CBC W/AUTO DIFF WBC: CPT | Performed by: EMERGENCY MEDICINE

## 2017-06-17 PROCEDURE — 96375 TX/PRO/DX INJ NEW DRUG ADDON: CPT

## 2017-06-17 RX ORDER — SUCRALFATE ORAL 1 G/10ML
1000 SUSPENSION ORAL ONCE
Status: COMPLETED | OUTPATIENT
Start: 2017-06-17 | End: 2017-06-17

## 2017-06-17 RX ORDER — DIPHENHYDRAMINE HYDROCHLORIDE 50 MG/ML
12.5 INJECTION INTRAMUSCULAR; INTRAVENOUS ONCE
Status: COMPLETED | OUTPATIENT
Start: 2017-06-17 | End: 2017-06-17

## 2017-06-17 RX ORDER — METOCLOPRAMIDE HYDROCHLORIDE 5 MG/ML
10 INJECTION INTRAMUSCULAR; INTRAVENOUS ONCE
Status: COMPLETED | OUTPATIENT
Start: 2017-06-17 | End: 2017-06-17

## 2017-06-17 RX ORDER — METOCLOPRAMIDE 10 MG/1
10 TABLET ORAL EVERY 8 HOURS PRN
Qty: 15 TABLET | Refills: 0 | Status: ON HOLD | OUTPATIENT
Start: 2017-06-17 | End: 2018-09-19 | Stop reason: ALTCHOICE

## 2017-06-17 RX ORDER — LORAZEPAM 2 MG/ML
0.5 INJECTION INTRAMUSCULAR ONCE
Status: COMPLETED | OUTPATIENT
Start: 2017-06-17 | End: 2017-06-17

## 2017-06-17 RX ADMIN — SUCRALFATE 1000 MG: 1 SUSPENSION ORAL at 12:38

## 2017-06-17 RX ADMIN — DIPHENHYDRAMINE HYDROCHLORIDE 12.5 MG: 50 INJECTION, SOLUTION INTRAMUSCULAR; INTRAVENOUS at 11:26

## 2017-06-17 RX ADMIN — METOCLOPRAMIDE 10 MG: 5 INJECTION, SOLUTION INTRAMUSCULAR; INTRAVENOUS at 11:26

## 2017-06-17 RX ADMIN — SODIUM CHLORIDE 1000 ML: 0.9 INJECTION, SOLUTION INTRAVENOUS at 11:26

## 2017-06-17 RX ADMIN — LORAZEPAM 0.5 MG: 2 INJECTION INTRAMUSCULAR; INTRAVENOUS at 11:27

## 2017-06-17 RX ADMIN — IOHEXOL 100 ML: 350 INJECTION, SOLUTION INTRAVENOUS at 11:42

## 2017-06-18 LAB
BACTERIA STL CULT: NORMAL
BACTERIA STL CULT: NORMAL

## 2017-06-19 LAB
ATRIAL RATE: 59 BPM
P AXIS: 56 DEGREES
PR INTERVAL: 146 MS
QRS AXIS: 40 DEGREES
QRSD INTERVAL: 92 MS
QT INTERVAL: 458 MS
QTC INTERVAL: 453 MS
T WAVE AXIS: 54 DEGREES
VENTRICULAR RATE: 59 BPM

## 2017-06-20 LAB — O+P STL CONC: NORMAL

## 2017-12-26 ENCOUNTER — HOSPITAL ENCOUNTER (EMERGENCY)
Facility: HOSPITAL | Age: 57
Discharge: HOME | End: 2017-12-26
Attending: EMERGENCY MEDICINE | Admitting: EMERGENCY MEDICINE
Payer: COMMERCIAL

## 2017-12-26 ENCOUNTER — ALLSCRIPTS OFFICE VISIT (OUTPATIENT)
Dept: OTHER | Facility: OTHER | Age: 57
End: 2017-12-26

## 2017-12-26 ENCOUNTER — APPOINTMENT (EMERGENCY)
Dept: CT IMAGING | Facility: HOSPITAL | Age: 57
End: 2017-12-26
Payer: COMMERCIAL

## 2017-12-26 VITALS
TEMPERATURE: 98.6 F | DIASTOLIC BLOOD PRESSURE: 82 MMHG | OXYGEN SATURATION: 95 % | HEART RATE: 59 BPM | BODY MASS INDEX: 28.63 KG/M2 | HEIGHT: 70 IN | WEIGHT: 200 LBS | RESPIRATION RATE: 18 BRPM | SYSTOLIC BLOOD PRESSURE: 146 MMHG

## 2017-12-26 DIAGNOSIS — R07.9 CHEST PAIN: ICD-10-CM

## 2017-12-26 DIAGNOSIS — R10.84 GENERALIZED ABDOMINAL PAIN: ICD-10-CM

## 2017-12-26 DIAGNOSIS — R10.9 ABDOMINAL PAIN: Primary | ICD-10-CM

## 2017-12-26 DIAGNOSIS — I10 ESSENTIAL (PRIMARY) HYPERTENSION: ICD-10-CM

## 2017-12-26 DIAGNOSIS — R11.15 CYCLICAL VOMITING, NOT INTRACTABLE: ICD-10-CM

## 2017-12-26 LAB
ALBUMIN SERPL BCP-MCNC: 4.3 G/DL (ref 3.5–5)
ALP SERPL-CCNC: 48 U/L (ref 46–116)
ALT SERPL W P-5'-P-CCNC: 28 U/L (ref 12–78)
ANION GAP SERPL CALCULATED.3IONS-SCNC: 7 MMOL/L (ref 4–13)
AST SERPL W P-5'-P-CCNC: 13 U/L (ref 5–45)
ATRIAL RATE: 62 BPM
BACTERIA UR QL AUTO: ABNORMAL /HPF
BASOPHILS # BLD AUTO: 0.04 THOUSANDS/ΜL (ref 0–0.1)
BASOPHILS NFR BLD AUTO: 1 % (ref 0–1)
BILIRUB SERPL-MCNC: 0.6 MG/DL (ref 0.2–1)
BILIRUB UR QL STRIP: NEGATIVE
BUN SERPL-MCNC: 19 MG/DL (ref 5–25)
CALCIUM SERPL-MCNC: 9.6 MG/DL (ref 8.3–10.1)
CHLORIDE SERPL-SCNC: 99 MMOL/L (ref 100–108)
CLARITY UR: CLEAR
CO2 SERPL-SCNC: 33 MMOL/L (ref 21–32)
COLOR UR: YELLOW
CREAT SERPL-MCNC: 0.96 MG/DL (ref 0.6–1.3)
EOSINOPHIL # BLD AUTO: 0.15 THOUSAND/ΜL (ref 0–0.61)
EOSINOPHIL NFR BLD AUTO: 2 % (ref 0–6)
ERYTHROCYTE [DISTWIDTH] IN BLOOD BY AUTOMATED COUNT: 12.5 % (ref 11.6–15.1)
GFR SERPL CREATININE-BSD FRML MDRD: 87 ML/MIN/1.73SQ M
GLUCOSE SERPL-MCNC: 119 MG/DL (ref 65–140)
GLUCOSE UR STRIP-MCNC: NEGATIVE MG/DL
HCT VFR BLD AUTO: 46.7 % (ref 36.5–49.3)
HGB BLD-MCNC: 16.4 G/DL (ref 12–17)
HGB UR QL STRIP.AUTO: ABNORMAL
KETONES UR STRIP-MCNC: NEGATIVE MG/DL
LEUKOCYTE ESTERASE UR QL STRIP: NEGATIVE
LIPASE SERPL-CCNC: 245 U/L (ref 73–393)
LYMPHOCYTES # BLD AUTO: 1.14 THOUSANDS/ΜL (ref 0.6–4.47)
LYMPHOCYTES NFR BLD AUTO: 16 % (ref 14–44)
MCH RBC QN AUTO: 30.7 PG (ref 26.8–34.3)
MCHC RBC AUTO-ENTMCNC: 35.1 G/DL (ref 31.4–37.4)
MCV RBC AUTO: 88 FL (ref 82–98)
MONOCYTES # BLD AUTO: 0.6 THOUSAND/ΜL (ref 0.17–1.22)
MONOCYTES NFR BLD AUTO: 9 % (ref 4–12)
NEUTROPHILS # BLD AUTO: 5.05 THOUSANDS/ΜL (ref 1.85–7.62)
NEUTS SEG NFR BLD AUTO: 72 % (ref 43–75)
NITRITE UR QL STRIP: NEGATIVE
NON-SQ EPI CELLS URNS QL MICRO: ABNORMAL /HPF
NRBC BLD AUTO-RTO: 0 /100 WBCS
P AXIS: 45 DEGREES
PH UR STRIP.AUTO: 7.5 [PH] (ref 4.5–8)
PLATELET # BLD AUTO: 238 THOUSANDS/UL (ref 149–390)
PMV BLD AUTO: 10.2 FL (ref 8.9–12.7)
POTASSIUM SERPL-SCNC: 3.1 MMOL/L (ref 3.5–5.3)
PR INTERVAL: 156 MS
PROT SERPL-MCNC: 7.4 G/DL (ref 6.4–8.2)
PROT UR STRIP-MCNC: NEGATIVE MG/DL
QRS AXIS: 55 DEGREES
QRSD INTERVAL: 94 MS
QT INTERVAL: 450 MS
QTC INTERVAL: 456 MS
RBC # BLD AUTO: 5.34 MILLION/UL (ref 3.88–5.62)
RBC #/AREA URNS AUTO: ABNORMAL /HPF
SODIUM SERPL-SCNC: 139 MMOL/L (ref 136–145)
SP GR UR STRIP.AUTO: 1.01 (ref 1–1.03)
T WAVE AXIS: 59 DEGREES
TROPONIN I SERPL-MCNC: <0.02 NG/ML
UROBILINOGEN UR QL STRIP.AUTO: 0.2 E.U./DL
VENTRICULAR RATE: 62 BPM
WBC # BLD AUTO: 7 THOUSAND/UL (ref 4.31–10.16)
WBC #/AREA URNS AUTO: ABNORMAL /HPF

## 2017-12-26 PROCEDURE — 83690 ASSAY OF LIPASE: CPT | Performed by: EMERGENCY MEDICINE

## 2017-12-26 PROCEDURE — 93005 ELECTROCARDIOGRAM TRACING: CPT

## 2017-12-26 PROCEDURE — 96375 TX/PRO/DX INJ NEW DRUG ADDON: CPT

## 2017-12-26 PROCEDURE — 93005 ELECTROCARDIOGRAM TRACING: CPT | Performed by: EMERGENCY MEDICINE

## 2017-12-26 PROCEDURE — 36415 COLL VENOUS BLD VENIPUNCTURE: CPT | Performed by: EMERGENCY MEDICINE

## 2017-12-26 PROCEDURE — 80053 COMPREHEN METABOLIC PANEL: CPT | Performed by: EMERGENCY MEDICINE

## 2017-12-26 PROCEDURE — 84484 ASSAY OF TROPONIN QUANT: CPT | Performed by: EMERGENCY MEDICINE

## 2017-12-26 PROCEDURE — 96361 HYDRATE IV INFUSION ADD-ON: CPT

## 2017-12-26 PROCEDURE — 99284 EMERGENCY DEPT VISIT MOD MDM: CPT

## 2017-12-26 PROCEDURE — 96374 THER/PROPH/DIAG INJ IV PUSH: CPT

## 2017-12-26 PROCEDURE — 85025 COMPLETE CBC W/AUTO DIFF WBC: CPT | Performed by: EMERGENCY MEDICINE

## 2017-12-26 PROCEDURE — 74177 CT ABD & PELVIS W/CONTRAST: CPT

## 2017-12-26 PROCEDURE — 81001 URINALYSIS AUTO W/SCOPE: CPT | Performed by: EMERGENCY MEDICINE

## 2017-12-26 RX ORDER — OLANZAPINE 10 MG/1
10 TABLET, ORALLY DISINTEGRATING ORAL ONCE
Status: COMPLETED | OUTPATIENT
Start: 2017-12-26 | End: 2017-12-26

## 2017-12-26 RX ORDER — ONDANSETRON 2 MG/ML
4 INJECTION INTRAMUSCULAR; INTRAVENOUS ONCE
Status: COMPLETED | OUTPATIENT
Start: 2017-12-26 | End: 2017-12-26

## 2017-12-26 RX ORDER — POTASSIUM CHLORIDE 20 MEQ/1
40 TABLET, EXTENDED RELEASE ORAL ONCE
Status: COMPLETED | OUTPATIENT
Start: 2017-12-26 | End: 2017-12-26

## 2017-12-26 RX ORDER — METOCLOPRAMIDE HYDROCHLORIDE 5 MG/ML
10 INJECTION INTRAMUSCULAR; INTRAVENOUS ONCE
Status: COMPLETED | OUTPATIENT
Start: 2017-12-26 | End: 2017-12-26

## 2017-12-26 RX ORDER — ACETAMINOPHEN 325 MG/1
975 TABLET ORAL ONCE
Status: COMPLETED | OUTPATIENT
Start: 2017-12-26 | End: 2017-12-26

## 2017-12-26 RX ORDER — DIPHENHYDRAMINE HYDROCHLORIDE 50 MG/ML
50 INJECTION INTRAMUSCULAR; INTRAVENOUS ONCE
Status: COMPLETED | OUTPATIENT
Start: 2017-12-26 | End: 2017-12-26

## 2017-12-26 RX ORDER — LANSOPRAZOLE 30 MG/1
30 CAPSULE, DELAYED RELEASE ORAL DAILY
COMMUNITY
End: 2018-06-05 | Stop reason: SDUPTHER

## 2017-12-26 RX ORDER — KETOROLAC TROMETHAMINE 30 MG/ML
15 INJECTION, SOLUTION INTRAMUSCULAR; INTRAVENOUS ONCE
Status: COMPLETED | OUTPATIENT
Start: 2017-12-26 | End: 2017-12-26

## 2017-12-26 RX ADMIN — IOHEXOL 100 ML: 350 INJECTION, SOLUTION INTRAVENOUS at 08:18

## 2017-12-26 RX ADMIN — ACETAMINOPHEN 975 MG: 325 TABLET ORAL at 08:28

## 2017-12-26 RX ADMIN — METOCLOPRAMIDE 10 MG: 5 INJECTION, SOLUTION INTRAMUSCULAR; INTRAVENOUS at 08:09

## 2017-12-26 RX ADMIN — OLANZAPINE 10 MG: 10 TABLET, ORALLY DISINTEGRATING ORAL at 10:37

## 2017-12-26 RX ADMIN — SODIUM CHLORIDE 1000 ML: 0.9 INJECTION, SOLUTION INTRAVENOUS at 07:47

## 2017-12-26 RX ADMIN — KETOROLAC TROMETHAMINE 15 MG: 30 INJECTION, SOLUTION INTRAMUSCULAR at 08:10

## 2017-12-26 RX ADMIN — ONDANSETRON 4 MG: 2 INJECTION INTRAMUSCULAR; INTRAVENOUS at 08:11

## 2017-12-26 RX ADMIN — DIPHENHYDRAMINE HYDROCHLORIDE 50 MG: 50 INJECTION, SOLUTION INTRAMUSCULAR; INTRAVENOUS at 08:26

## 2017-12-26 RX ADMIN — POTASSIUM CHLORIDE 40 MEQ: 1500 TABLET, EXTENDED RELEASE ORAL at 09:18

## 2017-12-26 NOTE — ED PROVIDER NOTES
History  Chief Complaint   Patient presents with    Abdominal Pain     Pt has had intermittand lower abdominal pain, vomitting and mild diarrhea since Thursday  Pt reports pain and vomitting begins after bowel movemtns which start out firm  Pain travels around to flank area at times  Pt report UTi 1 month ago but treatment complete  Patient is a 59-year-old male with history of multiple abdominal surgeries who reports to the emergency department with nausea, vomiting that started several days ago, this waxes and wanes  He does have diffuse abdominal pain  He also notes that earlier he had some left-sided abdominal pain that was associated with the nausea, it was somewhat sharp and he was concerned for kidney stones  No chest pain or shortness of breath  No fevers, chills  He is passing gas and passing stool still although obstruction is still on the differential   He does have diffuse abdominal tenderness with no specific pinpoint tenderness  Normal heart rate, normal heart and lung exam   Medical decision makin-year-old male, abdominal tenderness, multiple abdominal surgeries, will get CT scan, treat his symptoms  Prior to Admission Medications   Prescriptions Last Dose Informant Patient Reported? Taking?    ALPRAZolam (XANAX) 0 5 mg tablet   Yes Yes   Sig: Take 0 5 mg by mouth daily at bedtime as needed for anxiety   lansoprazole (PREVACID) 30 mg capsule   Yes Yes   Sig: Take 30 mg by mouth daily   metoclopramide (REGLAN) 10 mg tablet   No Yes   Sig: Take 1 tablet by mouth every 8 (eight) hours as needed (nausea) for up to 15 doses   valsartan-hydrochlorothiazide (DIOVAN-HCT) 160-25 MG per tablet   Yes Yes   Sig: Take 1 tablet by mouth daily      Facility-Administered Medications: None       Past Medical History:   Diagnosis Date    Anxiety     Diverticulitis     GERD (gastroesophageal reflux disease)     Hypertension        Past Surgical History:   Procedure Laterality Date    CHOLECYSTECTOMY      COLECTOMY      partial    KNEE ARTHROSCOPY      B/L    MO ESOPHAGOGASTRODUODENOSCOPY TRANSORAL DIAGNOSTIC N/A 3/1/2017    Procedure: EGD AND COLONOSCOPY;  Surgeon: Yue Parada MD;  Location: [facility]; Service: Gastroenterology       History reviewed  No pertinent family history  I have reviewed and agree with the history as documented  Social History   Substance Use Topics    Smoking status: Former Smoker     Quit date: 2003    Smokeless tobacco: Never Used    Alcohol use Yes      Comment: rarely        Review of Systems   Constitutional: Negative for fever  Respiratory: Negative for chest tightness and shortness of breath  Cardiovascular: Negative for chest pain and palpitations  Gastrointestinal: Positive for nausea and vomiting  Negative for abdominal pain  All other systems reviewed and are negative  Physical Exam  ED Triage Vitals   Temperature Pulse Respirations Blood Pressure SpO2   12/26/17 0739 12/26/17 0739 12/26/17 0739 12/26/17 0739 12/26/17 0739   98 6 °F (37 °C) 65 20 167/96 100 %      Temp Source Heart Rate Source Patient Position - Orthostatic VS BP Location FiO2 (%)   12/26/17 0739 12/26/17 0739 12/26/17 0830 12/26/17 0830 --   Oral Monitor Lying Left arm       Pain Score       12/26/17 0739       No Pain           Orthostatic Vital Signs  Vitals:    12/26/17 0739 12/26/17 0830 12/26/17 1000 12/26/17 1030   BP: 167/96 (!) 199/89 (!) 181/89 146/82   Pulse: 65 60 62 59   Patient Position - Orthostatic VS:  Lying Lying Lying       Physical Exam   Constitutional: He is oriented to person, place, and time  He appears well-developed and well-nourished  HENT:   Head: Normocephalic and atraumatic  Eyes: EOM are normal  Pupils are equal, round, and reactive to light  Neck: Normal range of motion  Neck supple  Cardiovascular: Normal rate, regular rhythm and normal heart sounds  No murmur heard    Pulmonary/Chest: Effort normal and breath sounds normal  No respiratory distress  He has no wheezes  Abdominal: Soft  Bowel sounds are normal  He exhibits no distension  There is tenderness  Musculoskeletal: Normal range of motion  He exhibits no edema or tenderness  Neurological: He is alert and oriented to person, place, and time  No cranial nerve deficit  Coordination normal    Skin: Skin is warm and dry  He is not diaphoretic  No erythema  Psychiatric: He has a normal mood and affect  His behavior is normal    Nursing note and vitals reviewed        ED Medications  Medications   sodium chloride 0 9 % bolus 1,000 mL (0 mL Intravenous Stopped 12/26/17 0920)   ketorolac (TORADOL) injection 15 mg (15 mg Intravenous Given 12/26/17 0810)   ondansetron (ZOFRAN) injection 4 mg (4 mg Intravenous Given 12/26/17 0811)   metoclopramide (REGLAN) injection 10 mg (10 mg Intravenous Given 12/26/17 0809)   acetaminophen (TYLENOL) tablet 975 mg (975 mg Oral Given 12/26/17 0828)   iohexol (OMNIPAQUE) 350 MG/ML injection (MULTI-DOSE) 100 mL (100 mL Intravenous Given 12/26/17 0818)   diphenhydrAMINE (BENADRYL) injection 50 mg (50 mg Intravenous Given 12/26/17 0826)   potassium chloride (K-DUR,KLOR-CON) CR tablet 40 mEq (40 mEq Oral Given 12/26/17 0918)   OLANZapine (ZyPREXA ZYDIS) dispersible tablet 10 mg (10 mg Oral Given 12/26/17 1037)       Diagnostic Studies  Results Reviewed     Procedure Component Value Units Date/Time    Urine Microscopic [[id number]]  (Abnormal) Collected:  12/26/17 0919    Lab Status:  Final result Specimen:  Urine from Urine, Clean Catch Updated:  12/26/17 0937     RBC, UA 0-1 (A) /hpf      WBC, UA None Seen /hpf      Epithelial Cells Occasional /hpf      Bacteria, UA None Seen /hpf     UA w Reflex to Microscopic w Reflex to Culture [[id number]]  (Abnormal) Collected:  12/26/17 0919    Lab Status:  Final result Specimen:  Urine from Urine, Clean Catch Updated:  12/26/17 0929     Color, UA Yellow     Clarity, UA Clear     Specific [place], UA 1 010     pH, UA 7 5     Leukocytes, UA Negative     Nitrite, UA Negative     Protein, UA Negative mg/dl      Glucose, UA Negative mg/dl      Ketones, UA Negative mg/dl      Urobilinogen, UA 0 2 E U /dl      Bilirubin, UA Negative     Blood, UA Small (A)    Troponin I [[id number]]  (Normal) Collected:  12/26/17 0800    Lab Status:  Final result Specimen:  Blood from Arm, Right Updated:  12/26/17 0830     Troponin I <0 02 ng/mL     Narrative:         [employer] Chemistry analyzer 99% cutoff is > 0 04 ng/mL in network labs    o cTnI 99% cutoff is useful only when applied to patients in the clinical setting of myocardial ischemia  o cTnI 99% cutoff should be interpreted in the context of clinical history, ECG findings and possibly cardiac imaging to establish correct diagnosis  o cTnI 99% cutoff may be suggestive but clearly not indicative of a coronary event without the clinical setting of myocardial ischemia  Comprehensive metabolic panel [[id number]]  (Abnormal) Collected:  12/26/17 0746    Lab Status:  Final result Specimen:  Blood from Arm, Right Updated:  12/26/17 0810     Sodium 139 mmol/L      Potassium 3 1 (L) mmol/L      Chloride 99 (L) mmol/L      CO2 33 (H) mmol/L      Anion Gap 7 mmol/L      BUN 19 mg/dL      Creatinine 0 96 mg/dL      Glucose 119 mg/dL      Calcium 9 6 mg/dL      AST 13 U/L      ALT 28 U/L      Alkaline Phosphatase 48 U/L      Total Protein 7 4 g/dL      Albumin 4 3 g/dL      Total Bilirubin 0 60 mg/dL      eGFR 87 ml/min/1 73sq m     Narrative:         National Kidney Disease Education Program recommendations are as follows:  GFR calculation is accurate only with a steady state creatinine  Chronic Kidney disease less than 60 ml/min/1 73 sq  meters  Kidney failure less than 15 ml/min/1 73 sq  meters      Lipase [[id number]]  (Normal) Collected:  12/26/17 0746    Lab Status:  Final result Specimen:  Blood from Arm, Right Updated:  12/26/17 0810     Lipase 245 u/L     CBC and differential [[id number]]  (Normal) Collected:  12/26/17 0746    Lab Status:  Final result Specimen:  Blood from Arm, Right Updated:  12/26/17 0753     WBC 7 00 Thousand/uL      RBC 5 34 Million/uL      Hemoglobin 16 4 g/dL      Hematocrit 46 7 %      MCV 88 fL      MCH 30 7 pg      MCHC 35 1 g/dL      RDW 12 5 %      MPV 10 2 fL      Platelets 966 Thousands/uL      nRBC 0 /100 WBCs      Neutrophils Relative 72 %      Lymphocytes Relative 16 %      Monocytes Relative 9 %      Eosinophils Relative 2 %      Basophils Relative 1 %      Neutrophils Absolute 5 05 Thousands/µL      Lymphocytes Absolute 1 14 Thousands/µL      Monocytes Absolute 0 60 Thousand/µL      Eosinophils Absolute 0 15 Thousand/µL      Basophils Absolute 0 04 Thousands/µL                  CT abdomen pelvis with contrast   Final Result by Tiffanie Fenton MD (12/26 5072)      No acute inflammatory stranding seen      Lumbar spondylosis with severe central canal narrowing at L3-4 level      Right paracentral calcified disc and mastoid complex with indentation thecal sac and likely impingement of the right L5 nerve root, unchanged from the previous study of June 12, 2017      Severe right hip arthritis         Workstation performed: [id number]                    Procedures  Procedures       Phone Contacts  ED Phone Contact    ED Course  ED Course as of Dec 28 0149   Tue Dec 26, 2017   0808 Ekg rate 62, sinus, no stemi narrow qrs    0904 Impression   No acute inflammatory stranding seen  Lumbar spondylosis with severe central canal narrowing at L3-4 level  Right paracentral calcified disc and mastoid complex with indentation thecal sac and likely impingement of the right L5 nerve root, unchanged from the previous study of June 12, 2017  Severe right hip arthritis                                MDM  CritCare Time    Disposition  Final diagnoses:   Abdominal pain   Chest pain     Time reflects when diagnosis was documented in both MDM as applicable and the Disposition within this note     Time User Action Codes Description Comment    12/26/2017  9:26 AM Adelina Victor Add [R10 9] Abdominal pain     12/26/2017  9:26 AM Adelina Victor Add [R07 9] Chest pain       ED Disposition     ED Disposition Condition Comment    Discharge  [address] discharge to home/self care  Condition at discharge: Good        Follow-up Information     Follow up With Specialties Details Why Lucía Diaz MD Nephrology   [address] [facility]  [phone]          Discharge Medication List as of 12/26/2017 10:26 AM      CONTINUE these medications which have NOT CHANGED    Details   ALPRAZolam (XANAX) 0 5 mg tablet Take 0 5 mg by mouth daily at bedtime as needed for anxiety, Until Discontinued, Historical Med      lansoprazole (PREVACID) 30 mg capsule Take 30 mg by mouth daily, Historical Med      metoclopramide (REGLAN) 10 mg tablet Take 1 tablet by mouth every 8 (eight) hours as needed (nausea) for up to 15 doses, Starting Sat 6/17/2017, Print      valsartan-hydrochlorothiazide (DIOVAN-HCT) 160-25 MG per tablet Take 1 tablet by mouth daily, Until Discontinued, Historical Med           No discharge procedures on file      ED Provider  Electronically Signed by           Cassi Vo MD  12/28/17 6485

## 2017-12-27 NOTE — PROGRESS NOTES
Assessment   1  GERD without esophagitis (530 81) (K21 9)   2  Nausea (787 02) (R11 0)    Plan   Abdominal pain, generalized    · Phenergan 25 MG Rectal Suppository; INSERT 1 SUPPOSITORY RECTALLY    EVERY 12 HOURS AS NEEDED FOR NAUSEA AND VOMITING   Rx By: Murtaza Isaacs; Dispense: 3 Days ; #:6 Suppository; Refill: 2;For: Abdominal pain, generalized; OLYA = N; Verified Transmission to [facility]; Last Updated By: System, [employer]; 12/26/2017 11:52:05 AM   · (1) 5HIAA QUANTITATIVE, URINE; Status:Active; Requested for:56Hkh2482;    Perform:[facility]; Due:70Mmo1782; Ordered; For:Abdominal pain, generalized; Ordered By:Jordan Holt;   · (1) C-REACTIVE PROTEIN; Status:Active; Requested for:86Hpa0082;    Perform:[facility]; Due:90Xmg5540; Ordered; For:Abdominal pain, generalized; Ordered By:Jordan Holt;   · (1) GASTRIN; Status:Active; Requested for:26Wzy8618;    Perform:[facility]; Due:91Awl4230; Ordered; For:Abdominal pain, generalized; Ordered By:Jordan Holt;   · (1) PORPHYRIN,URINE QUANTITATIVE; Status:Active; Requested for:91Ajo2940;    Perform:[facility]; Due:41Hsb5605; Ordered; For:Abdominal pain, generalized; Ordered By:Jordan Holt;   · (1) SED RATE; Status:Active; Requested for:24Qfo1682;    Perform:[facility]; Due:09Vob7391; Ordered; For:Abdominal pain, generalized; Ordered By:Jordan Holt;   · (1) VASOACTIVE INTESTINAL PEPTIDE (VIP); Status:Active; Requested for:21Kfu1004;    Perform:[facility]; Due:51Hxk8957; Ordered; For:Abdominal pain, generalized; Ordered By:Jordan Holt;   · (Q) PORPHYRINS, FRACTIONATED PLASMA; Status:Active; Requested for:30Wrg4528;    Perform:[facility]; Due:43Oml1240; Ordered; For:Abdominal pain, generalized; Ordered By:Jordan Holt;   · (Q) PORPHYRINS, TOTAL PLASMA; Status:Active; Requested for:91Gfs5216;    Perform:[facility]; Due:65Ulv2323; Ordered; For:Abdominal pain, generalized;  Ordered By:Kendal Holt; Abdominal pain, generalized, HTN (hypertension), benign    · (1) TSH WITH FT4 REFLEX; Status:Active; Requested for:37Oni1580;    Perform:[facility]; Due:21Gtf4599; Ordered; For:Abdominal pain, generalized, HTN (hypertension), benign; Ordered By:Jordan Holt; Discussion/Summary   Discussion Summary:    He is a 59-year-old male with likely cyclical vomiting syndrome with 3 days of continuous nausea and vomiting  He just came over from the emergency room for evaluation after being evaluated there  Heme-negative an endoscopy colonoscopy with me in the spring of this year  It had negative evaluation with Dr Andrea Venegas in the past    I'm giving him Phenergan suppositories 2-3 times a day over the next few days  In addition he is going to do Zofran and Ativan over the next 48 hours  He will also drink Pedialyte  I have ordered extensive lab testing to further finish the evaluation  he will come back in 6 weeks with Rosa saw him over the summer   We'll continue on the antiacid regimen for the Dubois's esophagus and reflux  Counseling Documentation With Imm: The patient was counseled regarding diagnostic results,-- prognosis,-- risks and benefits of treatment options  History of Present Illness   HPI: He is a 59-year-old male with likely cyclical vomiting syndrome who is here with an acute episode  The patient has had continuous nausea and vomiting and dry heaving for about 3 days now  He just came over from the emergency room where he had a normal CAT scan of the abdomen and pelvis and blood work showing only hypokalemia  He's tried Reglan and Zofran at home and this did not help  The patient was admitted over the summer for this and the patient has seen Dr Andrea Venegas in the past for this   The patient was starting to have a workup with me on the outpatient setting in March is no melanoma no hematochezia no fevers chills no vision problems no strokes symptoms      Review of Systems Complete-Male GI Adult:      Constitutional: No fever or chills, feels well, no tiredness, no recent weight gain or weight loss  Eyes: No complaints of eye pain, no red eyes, no discharge from eyes, no itchy eyes  ENT: no complaints of earache, no hearing loss, no nosebleeds, no nasal discharge, no sore throat, no hoarseness  Cardiovascular: No complaints of slow heart rate, no fast heart rate, no chest pain, no palpitations, no leg claudication, no lower extremity  Respiratory: No complaints of shortness of breath, no wheezing, no cough, no SOB on exertion, no orthopnea or PND  Gastrointestinal: as noted in HPI  Genitourinary: No complaints of dysuria, no incontinence, no hesitancy, no nocturia, no genital lesion, no testicular pain  Musculoskeletal: No complaints of arthralgia, no myalgias, no joint swelling or stiffness, no limb pain or swelling  Integumentary: No complaints of skin rash or skin lesions, no itching, no skin wound, no dry skin  Neurological: No compliants of headache, no confusion, no convulsions, no numbness or tingling, no dizziness or fainting, no limb weakness, no difficulty walking  Psychiatric: Is not suicidal, no sleep disturbances, no anxiety or depression, no change in personality, no emotional problems  Endocrine: No complaints of proptosis, no hot flashes, no muscle weakness, no erectile dysfunction, no deepening of the voice, no feelings of weakness  Hematologic/Lymphatic: No complaints of swollen glands, no swollen glands in the neck, does not bleed easily, no easy bruising  ROS Reviewed:    ROS reviewed  Active Problems   1  Abdominal pain, generalized (789 07) (R10 84)   2  Dubois's esophagus without dysplasia (530 85) (K22 70)   3  Diverticulitis of colon (562 11) (K57 32)   4  GERD without esophagitis (530 81) (K21 9)   5  History of colon polyps (V12 72) (Z86 010)   6   HTN (hypertension), benign (401 1) (I10)   7  Nausea (787 02) (R11 0)   8  Obesity due to excess calories, unspecified obesity severity (278 00) (E66 09)   9  JOSÉ MANUEL on CPAP (327 23,V46 8) (G47 33,Z99 89)    Past Medical History   Active Problems And Past Medical History Reviewed: The active problems and past medical history were reviewed and updated today  Surgical History   1  History of Cholecystectomy Laparoscopic   2  History of Knee Arthroscopy (Therapeutic)   3  History of Partial Colectomy - Sigmoid  Surgical History Reviewed: The surgical history was reviewed and updated today  Family History   Mother    1  Family history of diabetes mellitus (V18 0) (Z83 3)  Family History Reviewed: The family history was reviewed and updated today  Social History    · Former smoker (I94 17) (G40 983)  Social History Reviewed: The social history was reviewed and updated today  The social history was reviewed and is unchanged  Current Meds    1  ALPRAZolam 0 5 MG Oral Tablet; take 1 tablet every 6 to 8 hours as needed Recorded   2  Lansoprazole 30 MG Oral Capsule Delayed Release; TAKE 1 CAPSULE EVERY     MORNING DAILY; Last Rx:29Mar2017 Ordered   3  Loratadine 10 MG Oral Tablet; Take 1 tablet daily Recorded   4  Ondansetron HCl - 4 MG Oral Tablet; 1 tab q 6hrs prn Recorded   5  Valsartan-Hydrochlorothiazide 160-25 MG Oral Tablet; Take 1 tablet daily Recorded  Medication List Reviewed: The medication list was reviewed and updated today  Allergies   1  No Known Drug Allergies    Vitals   Vital Signs    Recorded: 57OKH1550 69:67RO   Systolic 618   Diastolic 90   Height Unobtainable Yes   Weight Unobtainable Yes     Physical Exam        Constitutional      General appearance: No acute distress, well appearing and well nourished  Eyes      Conjunctiva and lids: No swelling, erythema, or discharge  Pupils and irises: Equal, round and reactive to light         Ears, Nose, Mouth, and Throat      External inspection of ears and nose: Normal        Otoscopic examination: Tympanic membrance translucent with normal light reflex  Canals patent without erythema  Nasal mucosa, septum, and turbinates: Normal without edema or erythema  Oropharynx: Normal with no erythema, edema, exudate or lesions  Pulmonary      Respiratory effort: No increased work of breathing or signs of respiratory distress  Auscultation of lungs: Clear to auscultation, equal breath sounds bilaterally, no wheezes, no rales, no rhonci  Cardiovascular      Palpation of heart: Normal PMI, no thrills  Auscultation of heart: Normal rate and rhythm, normal S1 and S2, without murmurs  Examination of extremities for edema and/or varicosities: Normal        Carotid pulses: Normal        Abdomen      Abdomen: Non-tender, no masses  Liver and spleen: No hepatomegaly or splenomegaly  Lymphatic      Palpation of lymph nodes in neck: No lymphadenopathy  Musculoskeletal      Gait and station: Normal        Digits and nails: Normal without clubbing or cyanosis  Inspection/palpation of joints, bones, and muscles: Normal        Skin      Skin and subcutaneous tissue: Normal without rashes or lesions  Neurologic      Cranial nerves: Cranial nerves 2-12 intact  Reflexes: 2+ and symmetric  Sensation: No sensory loss  Health Management   Abdominal pain, generalized   COLONOSCOPY; every 5 years; Last 01HYJ9022; Next Due: 21HHG1009; Active  Diverticulitis of colon   COLONOSCOPY; every 5 years; Last 17VXC5933; Next Due: 54APZ7455; Active  History of colon polyps   COLONOSCOPY; every 5 years; Last 14NBU2243; Next Due: 80IVG8723;  Active    Signatures    Electronically signed by : Pallavi Diehl MD; Dec 26 2017 12:04PM EST                       (Author)

## 2017-12-28 ENCOUNTER — TRANSCRIBE ORDERS (OUTPATIENT)
Dept: ADMINISTRATIVE | Facility: HOSPITAL | Age: 57
End: 2017-12-28

## 2017-12-28 ENCOUNTER — APPOINTMENT (OUTPATIENT)
Dept: LAB | Facility: HOSPITAL | Age: 57
End: 2017-12-28
Attending: INTERNAL MEDICINE
Payer: COMMERCIAL

## 2017-12-28 ENCOUNTER — GENERIC CONVERSION - ENCOUNTER (OUTPATIENT)
Dept: OTHER | Facility: OTHER | Age: 57
End: 2017-12-28

## 2017-12-28 DIAGNOSIS — R10.84 GENERALIZED ABDOMINAL PAIN: ICD-10-CM

## 2017-12-28 DIAGNOSIS — R10.84 ABDOMINAL PAIN, GENERALIZED: ICD-10-CM

## 2017-12-28 DIAGNOSIS — R10.84 ABDOMINAL PAIN, GENERALIZED: Primary | ICD-10-CM

## 2017-12-28 DIAGNOSIS — I10 ESSENTIAL (PRIMARY) HYPERTENSION: ICD-10-CM

## 2017-12-28 LAB
CRP SERPL QL: <3 MG/L
ERYTHROCYTE [SEDIMENTATION RATE] IN BLOOD: 3 MM/HOUR (ref 0–10)
TSH SERPL DL<=0.05 MIU/L-ACNC: 0.43 UIU/ML (ref 0.36–3.74)

## 2017-12-28 PROCEDURE — 85652 RBC SED RATE AUTOMATED: CPT

## 2017-12-28 PROCEDURE — 36415 COLL VENOUS BLD VENIPUNCTURE: CPT

## 2017-12-28 PROCEDURE — 84443 ASSAY THYROID STIM HORMONE: CPT

## 2017-12-28 PROCEDURE — 83497 ASSAY OF 5-HIAA: CPT

## 2017-12-28 PROCEDURE — 84586 ASSAY OF VIP: CPT

## 2017-12-28 PROCEDURE — 86140 C-REACTIVE PROTEIN: CPT

## 2017-12-28 PROCEDURE — 84311 SPECTROPHOTOMETRY: CPT

## 2017-12-28 PROCEDURE — 82941 ASSAY OF GASTRIN: CPT

## 2017-12-28 PROCEDURE — 84120 ASSAY OF URINE PORPHYRINS: CPT

## 2017-12-29 LAB — GASTRIN SERPL-MCNC: 123 PG/ML (ref 0–115)

## 2017-12-31 LAB
5OH-INDOLEACETATE 24H UR-MCNC: 3.6 MG/L
5OH-INDOLEACETATE 24H UR-MRATE: 4.5 MG/24 HR (ref 0–14.9)

## 2018-01-02 ENCOUNTER — GENERIC CONVERSION - ENCOUNTER (OUTPATIENT)
Dept: OTHER | Facility: OTHER | Age: 58
End: 2018-01-02

## 2018-01-02 LAB
COPRO1 24H UR-MCNC: 17 UG/L
COPRO1 24H UR-MRATE: 21 UG/24 HR (ref 0–24)
COPRO3 24H UR-MCNC: 75 UG/L
COPRO3 24H UR-MRATE: 94 UG/24 HR (ref 0–74)
HEPTA-CP 24H UR-MRATE: 3 UG/24 HR (ref 0–4)
HEPTA-CP UR-MCNC: 2 UG/L
HEXA-CP 24H UR-MRATE: <1 UG/24 HR (ref 0–1)
HEXA-CP UR-MCNC: <1 UG/L
PENTA-CP 24H UR-MRATE: 1 UG/24 HR (ref 0–4)
PENTA-CP UR-MCNC: 1 UG/L
UROPOR 24H UR-MRATE: 5 UG/24 HR (ref 0–24)
UROPOR UR-MCNC: 4 UG/L

## 2018-01-04 LAB — VIP SERPL-MCNC: 33.3 PG/ML (ref 0–58.8)

## 2018-01-08 LAB — MISCELLANEOUS LAB TEST RESULT: NORMAL

## 2018-01-13 VITALS
WEIGHT: 220.5 LBS | BODY MASS INDEX: 31.57 KG/M2 | HEIGHT: 70 IN | HEART RATE: 80 BPM | DIASTOLIC BLOOD PRESSURE: 60 MMHG | SYSTOLIC BLOOD PRESSURE: 100 MMHG

## 2018-01-16 NOTE — RESULT NOTES
Verified Results  (1) TISSUE EXAM [id number] 11:15AM Belia Henry     Test Name Result Flag Reference   LAB AP CASE REPORT (Report)     Surgical Pathology Report             Case: [id number]                   Authorizing Provider: Gypsy Ricketts MD  Collected:      03/01/2017 1115        Ordering Location:   [facility] Received:      03/01/2017 1431                    Operating Room                                 Pathologist:      Jaycee Sandoval MD                              Specimens:  A) - Duodenum, Duodenum, cold bx r/o Celiac                              B) - Stomach, Stomach, cold bx r/o H  pylori                              C) - Esophagus, Distal esophagus, cold bx r/o Dubois's   LAB AP FINAL DIAGNOSIS (Report)     A  Duodenum, biopsy:    - No significant pathologic abnormalities  - No villous atrophy, increased intraepithelial lymphocytes or crypt   hyperplasia to suggest     malabsorptive enteropathy     - No active inflammation, granulomas, organisms, dysplasia or neoplasia   identified  B  Stomach, biopsy:    - Chronic inactive antral gastritis  - Immunostain for H  pylori (with appropriate positive control) is   negative  - No intestinal metaplasia, dysplasia or neoplasia identified  C  Distal esophagus, biopsy:    - Cardiac gastric and squamous junctional mucosa with intestinal   metaplasia (goblet cells present) consistent with     Dubois's esophagus     - No dysplasia or neoplasia identified  Electronically signed by Jaycee Sandoval MD on 3/3/2017 at 1:20 PM   LAB AP SURGICAL ADDITIONAL INFORMATION (Report)     These tests were developed and their performance characteristics   determined by [facility] or [facility]  They may not be cleared or approved by the [place]  Food and   Drug Administration  The FDA has determined that such clearance or   approval is not necessary   These tests are used for clinical purposes  They should not be regarded as investigational or for research  This   laboratory has been approved by CLIA 88, designated as a high-complexity   laboratory and is qualified to perform these tests  - Interpretation performed at [facility]   LAB AP GROSS DESCRIPTION (Report)     A  The specimen is received in formalin, labeled with the patient's name   and hospital number, and is designated duodenum biopsy  The specimen   consists of one tan-pink soft tissue fragment measuring 0 3 cm in greatest   dimension  Entirely submitted  One cassette  B  The specimen is received in formalin, labeled with the patient's name   and hospital number, and is designated stomach biopsy  The specimen   consists of 2 tan-pink soft tissue fragments measuring 0 2 cm and 0 5 cm   in greatest dimension  Entirely submitted  One cassette  C  The specimen is received in formalin, labeled with the patient's name   and hospital number, and is designated distal esophagus biopsy  The   specimen consists of 2 white tan-pink soft tissue fragments each measuring   0 3 cm in greatest dimension  Entirely submitted  One cassette  Note: The estimated total formalin fixation time based upon information   provided by the submitting clinician and the standard processing schedule   is 26 75 hours    MAS   LAB AP CLINICAL INFORMATION Cold bx r/o celiac     Cold bx r/o celiac       Plan  Dubois's esophagus without dysplasia    · Lansoprazole 30 MG Oral Capsule Delayed Release; TAKE 1 CAPSULE EVERY  MORNING DAILY

## 2018-01-23 VITALS — DIASTOLIC BLOOD PRESSURE: 90 MMHG | SYSTOLIC BLOOD PRESSURE: 138 MMHG

## 2018-01-23 NOTE — MISCELLANEOUS
Message  Return to work or school:   Dylan Jung is under my professional care  He was seen in my office on 12/26/2017   He is able to return to work on  12/29/2017       DR VELASCO        Signatures   Electronically signed by : Cristina Zaragoza, ; Dec 26 2017 12:03PM EST                       (Author)

## 2018-01-23 NOTE — MISCELLANEOUS
Message  Return to work or school:   Mansoor Branham is under my professional care  He was seen in my office on 12/31/2017   He is able to return to work on  1/2/2018      Dr Viridiana Goldstein  Please excuse from 12/31/17 and 1/1/18        Signatures   Electronically signed by : Viridiana Goldstein MD; Garrett  3 2018  4:35PM EST                       (Author)

## 2018-01-23 NOTE — RESULT NOTES
Verified Results  (1) 5HIAA QUANTITATIVE, URINE [id number] 01:29PM [facility] Order Number: [id number]     Test Name Result Flag Reference   5HIAA QUANT 24HR URINE 4 5 mg/24 hr  0 0 - 14 9   This test was developed and its performance characteristics  determined by [facility]  It has not been cleared or approved  by the Food and Drug Administration     5HIAA, URI  RAND 3 6 mg/L  Undefined   Total Volume: 1250 mL    Performed at:  [address]  [id number]  : Kim Man MD, Phone:  [phone] 01:29PM [facility] [place] Order Number: [id number]     Test Name Result Flag Reference   GASTRIN 123 pg/mL H 0 - 115   Siemens Immulite 2000 Immunochemiluminometric assay [facility])    Performed at:  [address]  [id number]  : Kim Man MD, Phone:  [phone]

## 2018-06-05 DIAGNOSIS — K21.9 CHRONIC GERD: Primary | ICD-10-CM

## 2018-06-05 NOTE — TELEPHONE ENCOUNTER
Pt hasn't been seen since 12/26/17 and had canceled every apt since but wants a 90 day refill  Should I send just a months supply and call the patient to come in for an office visit?

## 2018-06-06 RX ORDER — LANSOPRAZOLE 30 MG/1
30 CAPSULE, DELAYED RELEASE ORAL DAILY
Qty: 90 CAPSULE | Refills: 2 | Status: SHIPPED | OUTPATIENT
Start: 2018-06-06 | End: 2019-03-10 | Stop reason: SDUPTHER

## 2018-09-12 ENCOUNTER — APPOINTMENT (OUTPATIENT)
Dept: LAB | Facility: CLINIC | Age: 58
End: 2018-09-12
Payer: COMMERCIAL

## 2018-09-12 ENCOUNTER — TRANSCRIBE ORDERS (OUTPATIENT)
Dept: ADMINISTRATIVE | Facility: HOSPITAL | Age: 58
End: 2018-09-12

## 2018-09-12 DIAGNOSIS — Z12.5 SPECIAL SCREENING FOR MALIGNANT NEOPLASM OF PROSTATE: ICD-10-CM

## 2018-09-12 DIAGNOSIS — I10 ESSENTIAL HYPERTENSION, MALIGNANT: Primary | ICD-10-CM

## 2018-09-12 DIAGNOSIS — E78.5 HYPERLIPIDEMIA, UNSPECIFIED HYPERLIPIDEMIA TYPE: ICD-10-CM

## 2018-09-12 DIAGNOSIS — I10 ESSENTIAL HYPERTENSION, MALIGNANT: ICD-10-CM

## 2018-09-12 LAB
ALBUMIN SERPL BCP-MCNC: 4 G/DL (ref 3.5–5)
ALP SERPL-CCNC: 63 U/L (ref 46–116)
ALT SERPL W P-5'-P-CCNC: 34 U/L (ref 12–78)
ANION GAP SERPL CALCULATED.3IONS-SCNC: 7 MMOL/L (ref 4–13)
AST SERPL W P-5'-P-CCNC: 19 U/L (ref 5–45)
BASOPHILS # BLD AUTO: 0.03 THOUSANDS/ΜL (ref 0–0.1)
BASOPHILS NFR BLD AUTO: 1 % (ref 0–1)
BILIRUB SERPL-MCNC: 1.09 MG/DL (ref 0.2–1)
BILIRUB UR QL STRIP: NEGATIVE
BUN SERPL-MCNC: 15 MG/DL (ref 5–25)
CALCIUM SERPL-MCNC: 8.6 MG/DL (ref 8.3–10.1)
CHLORIDE SERPL-SCNC: 99 MMOL/L (ref 100–108)
CHOLEST SERPL-MCNC: 118 MG/DL (ref 50–200)
CLARITY UR: CLEAR
CO2 SERPL-SCNC: 30 MMOL/L (ref 21–32)
COLOR UR: YELLOW
CREAT SERPL-MCNC: 0.88 MG/DL (ref 0.6–1.3)
CREAT UR-MCNC: 78.5 MG/DL
CREAT UR-MCNC: 78.5 MG/DL
EOSINOPHIL # BLD AUTO: 0.14 THOUSAND/ΜL (ref 0–0.61)
EOSINOPHIL NFR BLD AUTO: 3 % (ref 0–6)
ERYTHROCYTE [DISTWIDTH] IN BLOOD BY AUTOMATED COUNT: 12.8 % (ref 11.6–15.1)
GFR SERPL CREATININE-BSD FRML MDRD: 95 ML/MIN/1.73SQ M
GLUCOSE P FAST SERPL-MCNC: 93 MG/DL (ref 65–99)
GLUCOSE UR STRIP-MCNC: NEGATIVE MG/DL
HCT VFR BLD AUTO: 46.4 % (ref 36.5–49.3)
HDLC SERPL-MCNC: 30 MG/DL (ref 40–60)
HGB BLD-MCNC: 15.9 G/DL (ref 12–17)
HGB UR QL STRIP.AUTO: NEGATIVE
IMM GRANULOCYTES # BLD AUTO: 0.01 THOUSAND/UL (ref 0–0.2)
IMM GRANULOCYTES NFR BLD AUTO: 0 % (ref 0–2)
KETONES UR STRIP-MCNC: NEGATIVE MG/DL
LDLC SERPL CALC-MCNC: 52 MG/DL (ref 0–100)
LEUKOCYTE ESTERASE UR QL STRIP: NEGATIVE
LYMPHOCYTES # BLD AUTO: 1.36 THOUSANDS/ΜL (ref 0.6–4.47)
LYMPHOCYTES NFR BLD AUTO: 25 % (ref 14–44)
MCH RBC QN AUTO: 30.9 PG (ref 26.8–34.3)
MCHC RBC AUTO-ENTMCNC: 34.3 G/DL (ref 31.4–37.4)
MCV RBC AUTO: 90 FL (ref 82–98)
MICROALBUMIN UR-MCNC: <5 MG/L (ref 0–20)
MICROALBUMIN/CREAT 24H UR: <6 MG/G CREATININE (ref 0–30)
MONOCYTES # BLD AUTO: 0.57 THOUSAND/ΜL (ref 0.17–1.22)
MONOCYTES NFR BLD AUTO: 11 % (ref 4–12)
NEUTROPHILS # BLD AUTO: 3.25 THOUSANDS/ΜL (ref 1.85–7.62)
NEUTS SEG NFR BLD AUTO: 60 % (ref 43–75)
NITRITE UR QL STRIP: NEGATIVE
NONHDLC SERPL-MCNC: 88 MG/DL
NRBC BLD AUTO-RTO: 0 /100 WBCS
PH UR STRIP.AUTO: 7 [PH] (ref 4.5–8)
PLATELET # BLD AUTO: 212 THOUSANDS/UL (ref 149–390)
PMV BLD AUTO: 10.7 FL (ref 8.9–12.7)
POTASSIUM SERPL-SCNC: 3.5 MMOL/L (ref 3.5–5.3)
PROT SERPL-MCNC: 7.5 G/DL (ref 6.4–8.2)
PROT UR STRIP-MCNC: NEGATIVE MG/DL
PROT UR-MCNC: 11 MG/DL
PROT/CREAT UR: 0.14 MG/G{CREAT} (ref 0–0.1)
RBC # BLD AUTO: 5.15 MILLION/UL (ref 3.88–5.62)
SODIUM SERPL-SCNC: 136 MMOL/L (ref 136–145)
SP GR UR STRIP.AUTO: 1.02 (ref 1–1.03)
TRIGL SERPL-MCNC: 179 MG/DL
URATE SERPL-MCNC: 6.3 MG/DL (ref 4.2–8)
UROBILINOGEN UR QL STRIP.AUTO: 0.2 E.U./DL
WBC # BLD AUTO: 5.36 THOUSAND/UL (ref 4.31–10.16)

## 2018-09-12 PROCEDURE — 80061 LIPID PANEL: CPT

## 2018-09-12 PROCEDURE — 36415 COLL VENOUS BLD VENIPUNCTURE: CPT

## 2018-09-12 PROCEDURE — 84153 ASSAY OF PSA TOTAL: CPT

## 2018-09-12 PROCEDURE — 81003 URINALYSIS AUTO W/O SCOPE: CPT | Performed by: INTERNAL MEDICINE

## 2018-09-12 PROCEDURE — 85025 COMPLETE CBC W/AUTO DIFF WBC: CPT

## 2018-09-12 PROCEDURE — 80053 COMPREHEN METABOLIC PANEL: CPT

## 2018-09-12 PROCEDURE — 84550 ASSAY OF BLOOD/URIC ACID: CPT

## 2018-09-12 PROCEDURE — 82043 UR ALBUMIN QUANTITATIVE: CPT | Performed by: INTERNAL MEDICINE

## 2018-09-12 PROCEDURE — 84156 ASSAY OF PROTEIN URINE: CPT | Performed by: INTERNAL MEDICINE

## 2018-09-12 PROCEDURE — 84154 ASSAY OF PSA FREE: CPT

## 2018-09-12 PROCEDURE — 82570 ASSAY OF URINE CREATININE: CPT | Performed by: INTERNAL MEDICINE

## 2018-09-13 LAB
PSA FREE MFR SERPL: 24.1 %
PSA FREE SERPL-MCNC: 0.53 NG/ML
PSA SERPL-MCNC: 2.2 NG/ML (ref 0–4)

## 2018-09-14 PROBLEM — K21.9 GASTROESOPHAGEAL REFLUX DISEASE: Status: ACTIVE | Noted: 2018-09-14

## 2018-09-18 ENCOUNTER — ANESTHESIA EVENT (OUTPATIENT)
Dept: PERIOP | Facility: HOSPITAL | Age: 58
End: 2018-09-18
Payer: COMMERCIAL

## 2018-09-19 ENCOUNTER — ANESTHESIA (OUTPATIENT)
Dept: PERIOP | Facility: HOSPITAL | Age: 58
End: 2018-09-19
Payer: COMMERCIAL

## 2018-09-19 ENCOUNTER — HOSPITAL ENCOUNTER (OUTPATIENT)
Facility: HOSPITAL | Age: 58
Setting detail: OUTPATIENT SURGERY
Discharge: HOME | End: 2018-09-19
Attending: INTERNAL MEDICINE | Admitting: INTERNAL MEDICINE
Payer: COMMERCIAL

## 2018-09-19 VITALS
RESPIRATION RATE: 21 BRPM | HEART RATE: 47 BPM | HEIGHT: 69 IN | DIASTOLIC BLOOD PRESSURE: 86 MMHG | OXYGEN SATURATION: 97 % | SYSTOLIC BLOOD PRESSURE: 134 MMHG | WEIGHT: 212.74 LBS | BODY MASS INDEX: 31.51 KG/M2 | TEMPERATURE: 97.7 F

## 2018-09-19 DIAGNOSIS — K21.9 GASTROESOPHAGEAL REFLUX DISEASE, ESOPHAGITIS PRESENCE NOT SPECIFIED: ICD-10-CM

## 2018-09-19 PROCEDURE — 43239 EGD BIOPSY SINGLE/MULTIPLE: CPT | Performed by: INTERNAL MEDICINE

## 2018-09-19 PROCEDURE — 88305 TISSUE EXAM BY PATHOLOGIST: CPT | Performed by: PATHOLOGY

## 2018-09-19 RX ORDER — PROPOFOL 10 MG/ML
INJECTION, EMULSION INTRAVENOUS AS NEEDED
Status: DISCONTINUED | OUTPATIENT
Start: 2018-09-19 | End: 2018-09-19 | Stop reason: SURG

## 2018-09-19 RX ORDER — MULTIVIT WITH IRON,MINERALS
LIQUID (ML) ORAL DAILY
COMMUNITY
End: 2018-11-13 | Stop reason: SDUPTHER

## 2018-09-19 RX ORDER — FEXOFENADINE HCL 180 MG/1
180 TABLET ORAL DAILY
COMMUNITY
End: 2019-08-27

## 2018-09-19 RX ORDER — IRBESARTAN AND HYDROCHLOROTHIAZIDE 150; 12.5 MG/1; MG/1
1 TABLET, FILM COATED ORAL
COMMUNITY

## 2018-09-19 RX ORDER — LIDOCAINE HYDROCHLORIDE 10 MG/ML
INJECTION, SOLUTION INFILTRATION; PERINEURAL AS NEEDED
Status: DISCONTINUED | OUTPATIENT
Start: 2018-09-19 | End: 2018-09-19 | Stop reason: SURG

## 2018-09-19 RX ORDER — SODIUM CHLORIDE, SODIUM LACTATE, POTASSIUM CHLORIDE, CALCIUM CHLORIDE 600; 310; 30; 20 MG/100ML; MG/100ML; MG/100ML; MG/100ML
125 INJECTION, SOLUTION INTRAVENOUS CONTINUOUS
Status: DISCONTINUED | OUTPATIENT
Start: 2018-09-19 | End: 2018-09-19 | Stop reason: HOSPADM

## 2018-09-19 RX ADMIN — PROPOFOL 120 MG: 10 INJECTION, EMULSION INTRAVENOUS at 12:24

## 2018-09-19 RX ADMIN — SODIUM CHLORIDE, SODIUM LACTATE, POTASSIUM CHLORIDE, AND CALCIUM CHLORIDE: .6; .31; .03; .02 INJECTION, SOLUTION INTRAVENOUS at 11:18

## 2018-09-19 RX ADMIN — LIDOCAINE HYDROCHLORIDE 100 MG: 10 INJECTION, SOLUTION INFILTRATION; PERINEURAL at 12:24

## 2018-09-19 NOTE — ANESTHESIA POSTPROCEDURE EVALUATION
Post-Op Assessment Note      CV Status:  Stable    Mental Status:  Alert and awake    Hydration Status:  Euvolemic    PONV Controlled:  Controlled    Airway Patency:  Patent    Post Op Vitals Reviewed: Yes          Staff: CRNA       Comments: vss sv nonobstructed uneventful          /77 (09/19/18 1231)    Temp 97 7 °F (36 5 °C) (09/19/18 1231)    Pulse (!) 48 (09/19/18 1231)   Resp 12 (09/19/18 1231)    SpO2 97 % (09/19/18 1231)

## 2018-09-19 NOTE — DISCHARGE INSTRUCTIONS
IMPRESSIONS:    Short-segment Dubois's esophagus     RECOMMENDATIONS:   Await pathology  Continue Prevacid  Reflux precautions    Upper Endoscopy   WHAT YOU NEED TO KNOW:   An upper endoscopy is also called an upper gastrointestinal (GI) endoscopy, or an esophagogastroduodenoscopy (EGD)  You may feel bloated, gassy, or have some abdominal discomfort after your procedure  Your throat may be sore for 24 to 36 hours  You may burp or pass gas from air that is still inside your body  DISCHARGE INSTRUCTIONS:   Call 911 for any of the following:   · You have sudden chest pain or trouble breathing  Seek care immediately if:   · You feel dizzy or faint  · You have trouble swallowing  · Your bowel movements are very dark or black  · Your abdomen is hard and firm and you have severe pain  · You vomit blood  Contact your healthcare provider if:   · You feel full or bloated and cannot burp or pass gas  · You have not had a bowel movement for 3 days after your procedure  · You have neck pain  · You have a fever or chills  · You have nausea or are vomiting  · You have a rash or hives  · You have questions or concerns about your endoscopy  Relieve a sore throat:  Suck on throat lozenges or crushed ice  Gargle with a small amount of warm salt water  Mix 1 teaspoon of salt and 1 cup of warm water to make salt water  Relieve gas and discomfort from bloating:  Lie on your right side with a heating pad on your abdomen  Take short walks to help pass gas  Eat small meals until bloating is relieved  Rest after your procedure: You have been given medicine to relax you  Do not  drive or make important decisions until the day after your procedure  Return to your normal activity as directed  You can usually return to work the day after your procedure  Follow up with your healthcare provider as directed:  Write down your questions so you remember to ask them during your visits     © 2017 [facility] [address] [place] [address] is for End User's use only and may not be sold, redistributed or otherwise used for commercial purposes  All illustrations and images included in CareNotes® are the copyrighted property of A D A M , Inc  or John Paul Vasquez  The above information is an  only  It is not intended as medical advice for individual conditions or treatments  Talk to your doctor, nurse or pharmacist before following any medical regimen to see if it is safe and effective for you

## 2018-09-19 NOTE — OP NOTE
ESOPHAGOGASTRODUODENOSCOPY    PROCEDURE: EGD/ Biopsy    INDICATIONS: Barretts Esophagus, Surveillance    POST-OP DIAGNOSIS: See the impression below    SEDATION: Monitored anesthesia care, check anesthesia records    PHYSICAL EXAM:  Vitals:    09/19/18 1143   BP: 136/78   Pulse: (!) 52   Resp: 16   Temp: 97 6 °F (36 4 °C)   SpO2: 97%     Body mass index is 31 42 kg/m²  General: NAD  Heart: S1 & S2 normal, RRR  Lungs: CTA, No rales or rhonchi  Abdomen: Soft, nontender, nondistended, good bowel sounds    CONSENT:  Informed consent was obtained for the procedure, including sedation after explaining the risks and benefits of the procedure  Risks including but not limited to bleeding, perforation, infection, aspiration were discussed in detail  Also explained about less than 100% sensitivity with the exam and other alternatives  PREPARATION:   EKG tracing, pulse oximetry, blood pressure were monitored throughout the procedure  Patient was identified by myself both verbally and by visual inspection of ID band  DESCRIPTION:   Patient was placed in the left lateral decubitus position and was sedated with the above medication  The gastroscope was introduced in to the oropharynx and the esophagus was intubated under direct visualization  Scope was passed down the esophagus up to 2nd part of the duodenum  A careful inspection was made as the gastroscope was withdrawn, including a retroflexed view of the stomach; findings and interventions are described below  FINDINGS:    #1  Esophagus and GEJ- a small 1 cm tongue of Dubois's esophagus was noted and biopsied extensively  #2  Stomach- the cardia fundus body and antrum were normal     #3  Duodenum- the bulb and 2nd portion of the duodenum were normal         IMPRESSIONS:    Short-segment Dubois's esophagus    RECOMMENDATIONS:   Await pathology  Continue Prevacid  Reflux precautions    COMPLICATIONS:  None; patient tolerated the procedure well    DISPOSITION: PACU  CONDITION: Stable    Alma Delia Devries MD  9/19/2018,12:29 PM

## 2018-09-19 NOTE — ANESTHESIA PREPROCEDURE EVALUATION
Review of Systems/Medical History          Cardiovascular  Hypertension ,    Pulmonary  Sleep apnea ,        GI/Hepatic    GERD ,             Endo/Other     GYN       Hematology   Musculoskeletal       Neurology   Psychology   Anxiety,              Physical Exam    Airway    Mallampati score: II         Dental   No notable dental hx     Cardiovascular      Pulmonary      Other Findings        Anesthesia Plan  ASA Score- 2     Anesthesia Type- IV sedation with anesthesia with ASA Monitors  Additional Monitors:   Airway Plan:     Comment: I, Dr Kalpesh Shelton, the attending physician, have personally seen and evaluated the patient prior to anesthetic care  I have reviewed the pre-anesthetic record, and other medical records if appropriate to the anesthetic care  If a CRNA is involved in the case, I have reviewed the CRNA assessment, if present, and agree  The patient is in a suitable condition to proceed with my formulated anesthetic plan        Plan Factors-    Induction- intravenous  Postoperative Plan-     Informed Consent- Anesthetic plan and risks discussed with patient  I personally reviewed this patient with the CRNA  Discussed and agreed on the Anesthesia Plan with the CRNA  Xavier Lambert

## 2018-09-21 ENCOUNTER — TELEPHONE (OUTPATIENT)
Dept: GASTROENTEROLOGY | Facility: CLINIC | Age: 58
End: 2018-09-21

## 2018-09-21 NOTE — TELEPHONE ENCOUNTER
----- Message from Nathaniel Merlos MD sent at 9/21/2018  9:58 AM EDT -----  pls tell him the biopsy shows no advancement in Barretts- return in 3yrs

## 2018-10-09 ENCOUNTER — OFFICE VISIT (OUTPATIENT)
Dept: OBGYN CLINIC | Facility: OTHER | Age: 58
End: 2018-10-09
Payer: COMMERCIAL

## 2018-10-09 VITALS
BODY MASS INDEX: 30.92 KG/M2 | DIASTOLIC BLOOD PRESSURE: 85 MMHG | HEIGHT: 70 IN | SYSTOLIC BLOOD PRESSURE: 134 MMHG | HEART RATE: 74 BPM | WEIGHT: 216 LBS

## 2018-10-09 DIAGNOSIS — M75.111 INCOMPLETE TEAR OF RIGHT ROTATOR CUFF: ICD-10-CM

## 2018-10-09 DIAGNOSIS — M75.21 BICEPS TENDONITIS ON RIGHT: Primary | ICD-10-CM

## 2018-10-09 PROCEDURE — 99203 OFFICE O/P NEW LOW 30 MIN: CPT | Performed by: ORTHOPAEDIC SURGERY

## 2018-10-09 PROCEDURE — 20610 DRAIN/INJ JOINT/BURSA W/O US: CPT | Performed by: ORTHOPAEDIC SURGERY

## 2018-10-09 RX ORDER — VALSARTAN AND HYDROCHLOROTHIAZIDE 80; 12.5 MG/1; MG/1
1 TABLET, FILM COATED ORAL
COMMUNITY
End: 2019-02-19

## 2018-10-09 RX ORDER — CELECOXIB 200 MG/1
200 CAPSULE ORAL
COMMUNITY
End: 2018-11-13 | Stop reason: ALTCHOICE

## 2018-10-09 RX ORDER — CYCLOBENZAPRINE HCL 10 MG
10 TABLET ORAL 3 TIMES DAILY
COMMUNITY
End: 2019-02-19

## 2018-10-09 RX ORDER — BETAMETHASONE SODIUM PHOSPHATE AND BETAMETHASONE ACETATE 3; 3 MG/ML; MG/ML
6 INJECTION, SUSPENSION INTRA-ARTICULAR; INTRALESIONAL; INTRAMUSCULAR; SOFT TISSUE
Status: COMPLETED | OUTPATIENT
Start: 2018-10-09 | End: 2018-10-09

## 2018-10-09 RX ORDER — BUPIVACAINE HYDROCHLORIDE 2.5 MG/ML
2 INJECTION, SOLUTION INFILTRATION; PERINEURAL
Status: COMPLETED | OUTPATIENT
Start: 2018-10-09 | End: 2018-10-09

## 2018-10-09 RX ADMIN — BETAMETHASONE SODIUM PHOSPHATE AND BETAMETHASONE ACETATE 6 MG: 3; 3 INJECTION, SUSPENSION INTRA-ARTICULAR; INTRALESIONAL; INTRAMUSCULAR; SOFT TISSUE at 14:03

## 2018-10-09 RX ADMIN — BUPIVACAINE HYDROCHLORIDE 2 ML: 2.5 INJECTION, SOLUTION INFILTRATION; PERINEURAL at 14:03

## 2018-10-09 NOTE — PROGRESS NOTES
Assessment:  1  Biceps tendonitis on right  Ambulatory referral to Physical Therapy    Large joint arthrocentesis   2  Incomplete tear of right rotator cuff  Ambulatory referral to Physical Therapy    Large joint arthrocentesis       Plan:  Discussed conservative vs surgical treatment options with the patient and his wife today  Patient received a right shoulder injection today  He will start a course of PT   If symptoms persist at the next visit we will discuss surgical intervention further  Patient will get copies of his office notes and MRI report from [facility] as we cannot comment on what Dr Ely Sims told the patient as we do not have access to his notes  To do next visit:  Return for 6-8 wks  Scribe Attestation    I,:   Chiara Arthur am acting as a scribe while in the presence of the attending physician :        I,:   Gary Steele MD personally performed the services described in this documentation    as scribed in my presence :          The patient has an examination and imaging consistent with a superior border partial subscapularis tear with likely result in long-head biceps tendon instability  This make sense based on his clinical description of his symptoms and is something that I typically treat with nonoperative care in the form of an injection and physical therapy  This was provided to him today  If he fails to improve an arthroscopic evaluation and treatment the form of arthroscopic subscapularis repair with long-head biceps tendon tenodesis would be indicated  We will re-evaluate him in 6 to 8 weeks to evaluate his progress    He does have significant questions that are important for me to answer from his previous opinion but I am unable to provide him with a detailed explanation of the other issues that were brought up by his other surgeon that he saw so he will obtain as office notes so we can review that together and I can help shed some light on some of the comments that I quite frankly cannot even comment on at this point  Subjective:   Jamin Newberry is a 62 y o  male who presents today for initial evaluation of right biceps / shoulder pain  Pain started about 1 yr ago without any specific HARI  Patient is here today for SO  Patient was seen by Dr Elvia Morrell at [facility] ([facility])  Patient was told he has "fluid" on his shoulder that needed to be sent for cultures  Aspiration was attempted in the office by Elvia Morrell however he was unable to get anything  Patient was then told that his RTC tear is beyond conservative treatments and he needed to have a right shoulder arthroscopic RTC repair that would put him out of work for 4-6 mos  Patient brought a copy of his MRI images however we do not have any reports available  Today patient complains of pain in the muscle belly of the right biceps  Patient describes the pain as a constant ache  He has increased pain predominantly with ER  Patient states he has a sharp pain putting his belt on and tearing boxes open at work  He denies any numbness, tingling, or radicular symptoms in the RUE  He has tried taking Aleve OTC which does help to decrease his pain only  Review of systems negative unless otherwise specified in HPI    Past Medical History:   Diagnosis Date    Anxiety     CPAP (continuous positive airway pressure) dependence     Diverticulitis     GERD (gastroesophageal reflux disease)     Hypertension     Sleep apnea        Past Surgical History:   Procedure Laterality Date    CHOLECYSTECTOMY      COLECTOMY      partial    KNEE ARTHROSCOPY      B/L    NH ESOPHAGOGASTRODUODENOSCOPY TRANSORAL DIAGNOSTIC N/A 3/1/2017    Procedure: EGD AND COLONOSCOPY;  Surgeon: Marianela Lopez MD;  Location: [facility]; Service: Gastroenterology    NH ESOPHAGOGASTRODUODENOSCOPY TRANSORAL DIAGNOSTIC N/A 9/19/2018    Procedure: ESOPHAGOGASTRODUODENOSCOPY (EGD);   Surgeon: Marianela Lopez MD; Location: [facility]; Service: Gastroenterology    REPLACEMENT TOTAL HIP W/  RESURFACING IMPLANTS         No family history on file  Social History     Occupational History    Not on file       Social History Main Topics    Smoking status: Former Smoker     Quit date: 2003    Smokeless tobacco: Never Used    Alcohol use No      Comment: rarely    Drug use: No    Sexual activity: Not on file         Current Outpatient Prescriptions:     ALPRAZolam (XANAX) 0 5 mg tablet, Take 0 5 mg by mouth daily at bedtime as needed for anxiety, Disp: , Rfl:     celecoxib (CeleBREX) 200 mg capsule, Take 200 mg by mouth, Disp: , Rfl:     cyclobenzaprine (FLEXERIL) 10 mg tablet, Take 10 mg by mouth Three times a day, Disp: , Rfl:     fexofenadine (ALLEGRA) 180 MG tablet, Take 180 mg by mouth daily, Disp: , Rfl:     irbesartan-hydrochlorothiazide (AVALIDE) 150-12 5 MG per tablet, Take 1 tablet by mouth daily, Disp: , Rfl:     lansoprazole (PREVACID) 30 mg capsule, Take 1 capsule (30 mg total) by mouth daily, Disp: 90 capsule, Rfl: 2    loratadine (CLARITIN) 10 mg tablet, Take 1 tablet by mouth daily, Disp: , Rfl:     Multiple Vitamins-Minerals (MULTIVITAMIN ADULT PO), Take 1 tablet by mouth, Disp: , Rfl:     Multiple Vitamins-Minerals (MULTIVITAMIN WITH IRON-MINERALS) liquid, Take by mouth daily, Disp: , Rfl:     Omega-3 Fatty Acids (FISH OIL PO), Take 2 g by mouth, Disp: , Rfl:     ondansetron (ZOFRAN) 4 mg tablet, Take 1 tablet by mouth every 6 (six) hours, Disp: , Rfl:     valsartan-hydrochlorothiazide (DIOVAN-HCT) 80-12 5 MG per tablet, Take 1 tablet by mouth, Disp: , Rfl:     No Known Allergies         Vitals:    10/09/18 1330   BP: 134/85   Pulse: 74       Objective:  Physical exam  · General: Awake, Alert, Oriented  · Eyes: Pupils equal, round and reactive to light  · Heart: regular rate and rhythm  · Lungs: No audible wheezing  · Abdomen: soft                      Left Shoulder Exam     Tenderness   Left shoulder tenderness location: anterior deltoid and AC joint  Range of Motion   Forward Flexion: 170   External Rotation: 70   Left shoulder internal rotation 0 degrees: lumbard  Muscle Strength   External Rotation: 4/5   Subscapularis: 4/5     Other   Sensation: normal  Pulse: present     Comments:    No erythema, ecchymosis, or effusion on exam today  Positive bear hug  Negative scott  Negative impingment              Diagnostics, reviewed and taken today if performed as documented: The attending physician has personally reviewed the pertinent films in PACS and interpretation is as follows:    Procedures, if performed today:  Large joint arthrocentesis  Date/Time: 10/9/2018 2:03 PM  Consent given by: parent  Site marked: site marked  Timeout: Immediately prior to procedure a time out was called to verify the correct patient, procedure, equipment, support staff and site/side marked as required   Supporting Documentation  Indications: pain   Procedure Details  Location: shoulder - R subacromial bursa  Preparation: Patient was prepped and draped in the usual sterile fashion  Needle size: 22 G  Ultrasound guidance: no  Approach: lateral  Medications administered: 2 mL bupivacaine 0 25 %; 6 mg betamethasone acetate-betamethasone sodium phosphate 6 (3-3) mg/mL    Patient tolerance: patient tolerated the procedure well with no immediate complications  Dressing:  Sterile dressing applied          Portions of the record may have been created with voice recognition software   Occasional wrong word or "sound a like" substitutions may have occurred due to the inherent limitations of voice recognition software   Read the chart carefully and recognize, using context, where substitutions have occurred

## 2018-10-09 NOTE — PATIENT INSTRUCTIONS
Discussed conservative vs surgical treatment options with the patient and his wife today  Patient received a right shoulder injection today  He will start a course of PT   If symptoms persist at the next visit we will discuss surgical intervention further  Patient will get copies of his office notes and MRI report from [address] [place] [address]  What is a corticosteroid? Injuries or disease such as arthritis, bursitis or tendonitis result in inflammation  In turn, this inflammation can cause swelling and pain  A local injection of a corticosteroid is provided to diminish inflammation  By doing so, it will also decrease pain and swelling which is making you uncomfortable  Is this the same thing as a Cortisone Injection? Cortisone® is a brand name of a corticosteroid used commonly in the past   Today I commonly use a more water-soluble corticosteroid named Celestone®  Will the injection hurt? As with any injection, you may feel pain at the time of the injection  Typically, I use a local anesthetic (Garcia Pale) in addition to the corticosteroid to determine if the injection has been placed in the appropriate location  Hence it is important to monitor your symptoms 4-6 hours after the injection, as the area will be anesthetized (numb) while the local anesthetic is working  Once the local anesthetic wears off, the intensity of pain can be the same as it was prior to the injection, or even worse  This does not mean that the injection is not working  The corticosteroid may take 24-72 hours to begin having a positive effect  If you do experience an increase in pain, the use of an ice pack on the area for 20 minutes at a time should help  It is also helpful to take an oral anti-inflammatory such as Tylenol® or Motrin® if you are able to medically do so  For this reason it is best to avoid activities that put stress on the area the first 24 hours after the injection      How long will pain relief last?  This will vary according to the type and severity of the symptoms being treated and the severity of the condition  Symptom relief may last weeks to months  I typically couple injections with physical therapy so that the underlying problem causing the inflammation may be treated as the pain diminishes  If the combination is not successful, you may be a surgical candidate  I have read bad things about steroids  Will these things happen to me? Corticosteroids, when utilized properly, are safe and effective drugs  When used in a low dose, potential adverse reactions are very rare  Some patients may experience a sensation of flushing for several days  Very rarely, there can be a local reaction which may include increased discomfort for a period of time in the areas that has been injected  A steroid should not be used over and over again  Multiple injections in the same area can produce adverse effects such as tissue atrophy and degeneration of tendon or cartilage  A small percentage of patients (< 0 1%) may develop an infection in the joint after injection  This is a treatable problem, but if neglected, may result in permanent disability  Signs of infection include redness, swelling, discharge, fevers, increasing pain and drainage from the injection site  This represents an emergency and you should contact our office immediately or seek treatment in the ER if after hours  If I have diabetes, will this injection affect me? If you are diabetic, an injection of a corticosteroid can raise your blood sugar level, requiring more insulin for a brief period of time  This may necessitate careful blood sugar maintenance  If the elevated sugars are not able to be controlled, contact your diabetic doctor for guidance

## 2018-10-11 NOTE — PROGRESS NOTES
PT Evaluation     Today's date: 10/11/2018  Patient name: Belinda Queen  : 1960  MRN: [id number]  Referring provider: Karen Gonsales MD  Dx:   Encounter Diagnosis     ICD-10-CM    1  Biceps tendonitis on right M75 21    2  Incomplete tear of right rotator cuff M75 111                   Assessment  Impairments: impaired physical strength, poor posture  and poor body mechanics  Functional limitations: None  Assessment details: Belinda stone 62 y o  male referred with primary diagnosis of Biceps tendonitis on right  (primary encounter diagnosis)  Incomplete tear of right rotator cuff   Patient presents with poor postural awareness, as well as, decreased RTC and scapular strength  Mild TTP right proximal bicep, but no pain with resisted testing  Treatment to include: Manual therapy techniques, extremity/core strengthening, neuromuscular control exercises, instruction in body mechanics/postural education, instruction in a comprehensive HEP, and modalities as needed  They will benefit from skilled PT services to address the above functional deficits and to decrease pain to promote a return to their premorbid level of function  Understanding of Dx/Px/POC: good   Prognosis: good    Goals  STG (2-3 weeks)  1  Patient will demonstrate ability to correct posture with VC's   2  Patient will demonstrate right shoulder strenth 4+5  LTG (4-6 weeks)  1  Patient will report being pain-free with RTW and performance of work-related tasks  2  Patient will demonstrate strength of 5/5  3  Patient will demonstrate scapular strength of 4+/5 in order to promote good SH rhythm  4   Patient will demonstrate good posture and body mechanics at all times to prevent further exacerbation of shoulder pain      Plan  Patient would benefit from: skilled physical therapy  Planned modality interventions: cryotherapy  Planned therapy interventions: manual therapy, patient education, postural training, neuromuscular re-education, strengthening, stretching, therapeutic activities, therapeutic exercise and home exercise program  Frequency: 2x week  Duration in weeks: 6  Plan of Care beginning date: 10/12/2018  Plan of Care expiration date: 2018  Treatment plan discussed with: patient        Subjective Evaluation    History of Present Illness  Date of onset: 2017  Mechanism of injury: Patient states 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  Symptoms were persistent  Patient states he had an MRI of his shoulder which showed he has an incomplete RTC tear and a small bicep tear  He has pain in right bicep  Patient saw Dr Stephanie Gallardo and had a cortisone injection 10/9/18 which resolved most of his pain  He is referred now to outpatient PT services  Recurrent probem    Quality of life: good    Pain  No pain reported  Current pain ratin  At best pain ratin  At worst pain ratin    Social Support    Employment status: working (Lifts up to [employer])  Hand dominance: right      Diagnostic Tests  MRI studies: abnormal (Incomplete tear of right RTC and proximal bicep)  Treatments  Previous treatment: injection treatment  Patient Goals  Patient goal: To strengthen his shoulder  to prevent re-injury        Objective     Static Posture     Scapulae  Left protracted and right protracted  Postural Observations  Seated posture: poor  Standing posture: poor  Correction of posture: makes symptoms better        Palpation     Right Tenderness of the biceps  Tenderness     Right Shoulder  Tenderness in the biceps tendon (proximal) and bicipital groove  [facility] the [facility] joint and acromion       Neurological Testing     Sensation     Shoulder     Right Shoulder   Intact: light touch    Active Range of Motion     Right Shoulder   Flexion: WFL  Abduction: WFL  Internal rotation BTB: L3 with pain    Strength/Myotome Testing     Right Shoulder     Planes of Motion   Flexion: 4   Abduction: 4 External rotation at 0°: 4   Internal rotation at 0°: 4+     Isolated Muscles   Lower trapezius: 4-   Middle trapezius: 4   Rhomboids: 4-     Tests     Right Shoulder   Negative AC shear, Neer's and Speed's             Precautions Anxiety, HTN    Specialty Daily Treatment Diary     Manual                                                     Exercise Diary  10/12       UBE 1/2 F/R        Webslide rows H,M,L        T-band shoulder IR BTB x 20       Tband shoulder ER BTB x 20       T-band shoulder extension BTB x 20       D2 flexion diagnols        Body blade S/I at 90 deg flexion and abd        Shoulder 3 ways        Prone I,Y,T        Prone rows        S/L shoulder ER        S/L sleeper stretch        Corner pec stretch        Pt education Body mechanics and posture                                                           Modalities        CP prn right shoulder

## 2018-10-12 ENCOUNTER — EVALUATION (OUTPATIENT)
Dept: PHYSICAL THERAPY | Facility: CLINIC | Age: 58
End: 2018-10-12
Payer: COMMERCIAL

## 2018-10-12 DIAGNOSIS — M75.21 BICEPS TENDONITIS ON RIGHT: Primary | ICD-10-CM

## 2018-10-12 DIAGNOSIS — M75.111 INCOMPLETE TEAR OF RIGHT ROTATOR CUFF: ICD-10-CM

## 2018-10-12 PROCEDURE — G8985 CARRY GOAL STATUS: HCPCS | Performed by: PHYSICAL THERAPIST

## 2018-10-12 PROCEDURE — 97161 PT EVAL LOW COMPLEX 20 MIN: CPT | Performed by: PHYSICAL THERAPIST

## 2018-10-12 PROCEDURE — G8984 CARRY CURRENT STATUS: HCPCS | Performed by: PHYSICAL THERAPIST

## 2018-10-12 PROCEDURE — 97110 THERAPEUTIC EXERCISES: CPT | Performed by: PHYSICAL THERAPIST

## 2018-10-12 PROCEDURE — 97112 NEUROMUSCULAR REEDUCATION: CPT | Performed by: PHYSICAL THERAPIST

## 2018-10-15 ENCOUNTER — OFFICE VISIT (OUTPATIENT)
Dept: PHYSICAL THERAPY | Facility: CLINIC | Age: 58
End: 2018-10-15
Payer: COMMERCIAL

## 2018-10-15 DIAGNOSIS — M75.21 BICEPS TENDONITIS ON RIGHT: Primary | ICD-10-CM

## 2018-10-15 DIAGNOSIS — M75.111 INCOMPLETE TEAR OF RIGHT ROTATOR CUFF: ICD-10-CM

## 2018-10-15 PROCEDURE — 97110 THERAPEUTIC EXERCISES: CPT | Performed by: PHYSICAL THERAPIST

## 2018-10-15 NOTE — PROGRESS NOTES
Daily Note     Today's date: 10/15/2018  Patient name: Sabrina Gould  : 1960  MRN: [id number]  Referring provider: Yesy De Leon MD  Dx:   Encounter Diagnosis     ICD-10-CM    1  Biceps tendonitis on right M75 21    2  Incomplete tear of right rotator cuff M75 111                   Subjective: Patient reports he has been compliant with his HEP, but would like to ensure he is performing his exercises correctly  Pt states since cortisone injection, his symptoms are 75% improved  Objective: See treatment diary below      Assessment: Pt required moderate verbal and tactile cues to perform exercises with correct form and technique  Pt was able to tolerate all exercises with minimal pain/discomfort  Patient demonstrated fatigue post treatment and would benefit from continued PT      Plan: Progress treatment as tolerated        Precautions Anxiety, HTN     Specialty Daily Treatment Diary      Manual                                                                                          Exercise Diary  10/12  10-15         UBE /2 F/R    3'/3'         Webslide rows H,M,L             T-band shoulder IR BTB x 20  BTB x 30         Tband shoulder ER BTB x 20  BTB x 30         T-band shoulder extension BTB x 20  BTB x 30         D2 flexion diagnols             Body blade S/I at 90 deg flexion and abd             Shoulder 3 ways             Prone I,Y,T    2 x 10 ea motion         Prone rows    2 x 10 3#         S/L shoulder ER    2 x 10 3#         S/L sleeper stretch    3 x 30"          Corner pec stretch    3 x 30"         Pt education Body mechanics and posture                                                                                                     Modalities             CP prn right shoulder

## 2018-10-17 ENCOUNTER — OFFICE VISIT (OUTPATIENT)
Dept: PHYSICAL THERAPY | Facility: CLINIC | Age: 58
End: 2018-10-17
Payer: COMMERCIAL

## 2018-10-17 DIAGNOSIS — M75.111 INCOMPLETE TEAR OF RIGHT ROTATOR CUFF: ICD-10-CM

## 2018-10-17 DIAGNOSIS — M75.21 BICEPS TENDONITIS ON RIGHT: Primary | ICD-10-CM

## 2018-10-17 PROCEDURE — 97110 THERAPEUTIC EXERCISES: CPT

## 2018-10-17 NOTE — PROGRESS NOTES
Daily Note     Today's date: 10/17/2018  Patient name: Con Craig  : 1960  MRN: [id number]  Referring provider: Jess Perez MD  Dx:   Encounter Diagnosis     ICD-10-CM    1  Biceps tendonitis on right M75 21    2  Incomplete tear of right rotator cuff M75 111                   Subjective: Patient reported feeling okay today  No increase in pain from last session  Objective: See treatment diary below      Assessment: Pt required VC for both stretched IR and Corner stretch  Pt performed IR wall stretch today instead of supine  Pt noted greater stretching results and less pain  Patient demonstrated fatigue post treatment and would benefit from continued PT      Plan: Progress treatment as tolerated  NV give patient a updated form for HEP       Precautions Anxiety, HTN     Specialty Daily Treatment Diary      Manual                                                                                          Exercise Diary  10/12  10-15  10/17       UBE /2 F/R    3'/3'  3'/3'       Webslide rows H,M,L      M PTB x30       T-band shoulder IR BTB x 20  BTB x 30  PTB x30       Tband shoulder ER BTB x 20  BTB x 30  PTB 30x       T-band shoulder extension BTB x 20  BTB x 30  PTB x30       D2 flexion diagnols             Body blade S/I at 90 deg flexion and abd      NV?       Shoulder 3 ways             Prone I,Y,T    2 x 10 ea motion  2x10 3# for T       Prone rows    2 x 10 3#  2x10 3#       S/L shoulder ER    2 x 10 3#  2x10 3#       S/L sleeper stretch    3 x 30"   3x 30"       Corner pec stretch    3 x 30"         Pt education Body mechanics and posture                                                                                                     Modalities  10/17           CP prn right shoulder  np                                            * patient Supervised by Deisi Duarte, PT from 8:30 to 9am

## 2018-10-22 ENCOUNTER — OFFICE VISIT (OUTPATIENT)
Dept: PHYSICAL THERAPY | Facility: CLINIC | Age: 58
End: 2018-10-22
Payer: COMMERCIAL

## 2018-10-22 DIAGNOSIS — M75.111 INCOMPLETE TEAR OF RIGHT ROTATOR CUFF: ICD-10-CM

## 2018-10-22 DIAGNOSIS — M75.21 BICEPS TENDONITIS ON RIGHT: Primary | ICD-10-CM

## 2018-10-22 PROCEDURE — 97110 THERAPEUTIC EXERCISES: CPT

## 2018-10-26 ENCOUNTER — TRANSCRIBE ORDERS (OUTPATIENT)
Dept: ADMINISTRATIVE | Facility: HOSPITAL | Age: 58
End: 2018-10-26

## 2018-10-26 ENCOUNTER — OFFICE VISIT (OUTPATIENT)
Dept: PHYSICAL THERAPY | Facility: CLINIC | Age: 58
End: 2018-10-26
Payer: COMMERCIAL

## 2018-10-26 ENCOUNTER — APPOINTMENT (OUTPATIENT)
Dept: LAB | Facility: CLINIC | Age: 58
End: 2018-10-26
Payer: COMMERCIAL

## 2018-10-26 DIAGNOSIS — M75.21 BICEPS TENDONITIS ON RIGHT: Primary | ICD-10-CM

## 2018-10-26 DIAGNOSIS — Z47.1 AFTERCARE FOLLOWING JOINT REPLACEMENT SURGERY, UNSPECIFIED JOINT: ICD-10-CM

## 2018-10-26 DIAGNOSIS — Z47.1 AFTERCARE FOLLOWING JOINT REPLACEMENT SURGERY, UNSPECIFIED JOINT: Primary | ICD-10-CM

## 2018-10-26 DIAGNOSIS — M75.111 INCOMPLETE TEAR OF RIGHT ROTATOR CUFF: ICD-10-CM

## 2018-10-26 LAB
CRP SERPL QL: <3 MG/L
ERYTHROCYTE [SEDIMENTATION RATE] IN BLOOD: 2 MM/HOUR (ref 0–10)

## 2018-10-26 PROCEDURE — 36415 COLL VENOUS BLD VENIPUNCTURE: CPT

## 2018-10-26 PROCEDURE — 97110 THERAPEUTIC EXERCISES: CPT

## 2018-10-26 PROCEDURE — 85652 RBC SED RATE AUTOMATED: CPT

## 2018-10-26 PROCEDURE — 86140 C-REACTIVE PROTEIN: CPT

## 2018-10-26 NOTE — PROGRESS NOTES
Daily Note     Today's date: 10/26/2018  Patient name: Inocencio Davis  : 1960  MRN: [id number]  Referring provider: Lazarus Hake, MD  Dx:   Encounter Diagnosis     ICD-10-CM    1  Biceps tendonitis on right M75 21    2  Incomplete tear of right rotator cuff M75 111                   Subjective: Patient reported still not 100% patient feels like he needs another Cortisone shot soon  Pt reported not compliant with Hep this week  Objective: See treatment diary below      Assessment: Progressed number of sets with prone exercises  Pt received an additional set of HEP program sheet as a friendly reminder  Soreness along with fatigue post Body blade exercises  Continue progressing patients exercises  Pt reported he tries to complete HEP BID  Patient demonstrated fatigue post treatment and would benefit from continued PT      Plan: Progress treatment as tolerated  Continue progressing patient       Precautions Anxiety, HTN     Specialty Daily Treatment Diary      Manual                                                                                          Exercise Diary  10/12  10-15  10/17  10/22  10/26   UBE / F/R    3'/3'  3'/3'  5'5'  5'/5'   Webslide rows H,M,L      M PTB x30  PTB x30  PTBx 30   T-band shoulder IR BTB x 20  BTB x 30  PTB x30  PTB x30  PTB x30   Tband shoulder ER BTB x 20  BTB x 30  PTB 30x  PTB 30x  PTB 30x   T-band shoulder extension BTB x 20  BTB x 30  PTB x30  PTB x30  PTB x30   D2 flexion diagnols             Body blade S/I at 90 deg flexion and abd      NV?  30" x2  30" x2   Shoulder 3 ways             Prone I,Y,T    2 x 10 ea motion  2x10 3# for T  2x10 5#  3x 10 5#   Prone rows    2 x 10 3#  2x10 3#  2x 10 5#  5# 3x10   S/L shoulder ER    2 x 10 3#  2x10 3#  2x10 5#  5# 3x10   S/L sleeper stretch    3 x 30"   3x 30"  3x 30"  3x 30"   Corner pec stretch    3 x 30"    3x 30" Visual cues  3x 30"   Pt education Body mechanics and posture        Body mechanics with proper posture                                                                                             Modalities  10/17  10/26         CP prn right shoulder  np  np

## 2018-10-29 ENCOUNTER — OFFICE VISIT (OUTPATIENT)
Dept: PHYSICAL THERAPY | Facility: CLINIC | Age: 58
End: 2018-10-29
Payer: COMMERCIAL

## 2018-10-29 DIAGNOSIS — M75.21 BICEPS TENDONITIS ON RIGHT: Primary | ICD-10-CM

## 2018-10-29 DIAGNOSIS — M75.111 INCOMPLETE TEAR OF RIGHT ROTATOR CUFF: ICD-10-CM

## 2018-10-29 PROCEDURE — 97112 NEUROMUSCULAR REEDUCATION: CPT | Performed by: PHYSICAL THERAPIST

## 2018-10-29 PROCEDURE — 97110 THERAPEUTIC EXERCISES: CPT | Performed by: PHYSICAL THERAPIST

## 2018-10-31 ENCOUNTER — OFFICE VISIT (OUTPATIENT)
Dept: PHYSICAL THERAPY | Facility: CLINIC | Age: 58
End: 2018-10-31
Payer: COMMERCIAL

## 2018-10-31 DIAGNOSIS — M75.111 INCOMPLETE TEAR OF RIGHT ROTATOR CUFF: ICD-10-CM

## 2018-10-31 DIAGNOSIS — M75.21 BICEPS TENDONITIS ON RIGHT: Primary | ICD-10-CM

## 2018-10-31 PROCEDURE — 97112 NEUROMUSCULAR REEDUCATION: CPT | Performed by: PHYSICAL THERAPIST

## 2018-10-31 PROCEDURE — 97110 THERAPEUTIC EXERCISES: CPT | Performed by: PHYSICAL THERAPIST

## 2018-10-31 NOTE — PROGRESS NOTES
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  Objective: See treatment diary below      Assessment: Progressed exercises this session to include theraband PNF strengthening to enhance coordination and functional strength  Pt was able to tolerate progressions with moderate fatigue and difficulty  Pt required minimal verbal and tactile cues to perform periscapular strengthening exercises with correct form and technique  Plan: Progress treatment as tolerated          Precautions Anxiety, HTN     Specialty Daily Treatment Diary      Manual                                                                                          Exercise Diary  10/29 10-31  10/17  10/22  10/26   UBE  F/R  x10' x 10'  3'/3'  5'5'  5'/5'   Webslide rows H,M,L  PTBx30 PTB x 30  M PTB x30  PTB x30  PTBx 30   T-band shoulder IR PTB x 30 PTB x 30  PTB x30  PTB x30  PTB x30   Tband shoulder ER PTB x 30 PTB x 30  PTB 30x  PTB 30x  PTB 30x   T-band shoulder extension PTB x 30 PTB x 30  PTB x30  PTB x30  PTB x30   D2 flexion diagonols    D1ext: 2 x 10 GTB, D2 2 flexion: 2 x 10 OTB         Body blade S/I at 90 deg flexion and abd  2x30" 2 x 30"  NV?  30" x2  30" x2   Shoulder 3 ways         NV         Prone I,Y,T 5# 3x10 5# 3x10  2x10 3# for T  2x10 5#  3x 10 5#   Prone rows 5# 3x10 5# 3x10  2x10 3#  2x 10 5#  5# 3x10   S/L shoulder ER 5# 3x10 5# 2x10  2x10 3#  2x10 5#  5# 3x10   S/L sleeper stretch     3x 30"  3x 30"  3x 30"   Corner pec stretch       3x 30" Visual cues  3x 30"   Pt education Body mechanics and posture        Body mechanics with proper posture                                                                                             Modalities  10/17  10/26         CP prn right shoulder  np  np

## 2018-11-01 ENCOUNTER — APPOINTMENT (OUTPATIENT)
Dept: PHYSICAL THERAPY | Facility: CLINIC | Age: 58
End: 2018-11-01
Payer: COMMERCIAL

## 2018-11-05 ENCOUNTER — EVALUATION (OUTPATIENT)
Dept: PHYSICAL THERAPY | Facility: CLINIC | Age: 58
End: 2018-11-05
Payer: COMMERCIAL

## 2018-11-05 DIAGNOSIS — M75.21 BICEPS TENDONITIS ON RIGHT: Primary | ICD-10-CM

## 2018-11-05 DIAGNOSIS — M75.111 INCOMPLETE TEAR OF RIGHT ROTATOR CUFF: ICD-10-CM

## 2018-11-05 PROCEDURE — 97112 NEUROMUSCULAR REEDUCATION: CPT | Performed by: PHYSICAL THERAPIST

## 2018-11-05 PROCEDURE — G8992 OTHER PT/OT  D/C STATUS: HCPCS | Performed by: PHYSICAL THERAPIST

## 2018-11-05 PROCEDURE — G8991 OTHER PT/OT GOAL STATUS: HCPCS | Performed by: PHYSICAL THERAPIST

## 2018-11-05 PROCEDURE — 97110 THERAPEUTIC EXERCISES: CPT | Performed by: PHYSICAL THERAPIST

## 2018-11-05 NOTE — PROGRESS NOTES
PT Discharge    Today's date: 2018  Patient name: Nathanael Gilmore  : 1960  MRN: [id number]  Referring provider: Darryl Scott MD  Dx:   Encounter Diagnosis     ICD-10-CM    1  Biceps tendonitis on right M75 21    2  Incomplete tear of right rotator cuff M75 111                   Assessment  Impairments: impaired physical strength, poor posture  and poor body mechanics  Functional limitations: None  Assessment details: Patient demonstrates ROM and strength WFL  He is not having pain  Has resumed his previous level of function without limitations  He has been pain-free for the past month except for a slight ache in his shoulder yesterday  There is no TTP at biceps tendon  He is independent with a HEP and feels he requires no further skilled PT services at this time  Understanding of Dx/Px/POC: good  Goals  STG (2-3 weeks)  1  Patient will demonstrate ability to correct posture with VC's - met  2  Patient will demonstrate right shoulder strenth 4+5- met  LTG (4-6 weeks)  1  Patient will report being pain-free with RTW and performance of work-related tasks - met  2  Patient will demonstrate strength of 5/5 - met  3  Patient will demonstrate scapular strength of 4+/5 in order to promote good SH rhythm - met  4  Patient will demonstrate good posture and body mechanics at all times to prevent further exacerbation of shoulder pain - met      Plan  Patient would benefit from: skilled physical therapy  Planned modality interventions: cryotherapy  Planned therapy interventions: patient education and home exercise program  Plan of Care beginning date: 10/12/2018  Plan of Care expiration date: 2018  Treatment plan discussed with: patient        Subjective Evaluation    History of Present Illness  Date of onset: 2017  Mechanism of injury: Patient is 80% improved since starting physcal therapy  He states that after the cortisone injection wore off, he was about 50% improved    No longer has sharp, "snapping" pain "  The past couple of days he has had a slight ache at the end of the day  Has been aware of how he is breaking down boxes at work  Has some discomfort when lying on his shoulder  He feels his shoulder is stronger since starting therapy  Performs HEP daily  Feels he is ready for DC to HEP  Recurrent probem    Quality of life: excellent    Pain  No pain reported  Current pain ratin  At best pain ratin  At worst pain ratin    Social Support    Employment status: working (Lifts up to [employer])  Hand dominance: right      Diagnostic Tests  MRI studies: abnormal (Incomplete tear of right RTC and proximal bicep)  Treatments  Previous treatment: injection treatment  Patient Goals  Patient goal: To strengthen his shoulder  to prevent re-injury        Objective     Static Posture     Scapulae  Left protracted and right protracted  Postural Observations  Seated posture: poor  Standing posture: poor  Correction of posture: makes symptoms better        Palpation     Right   No palpable tenderness to the biceps  Tenderness     Right Shoulder  No tenderness in the [facility] joint, acromion, biceps tendon (proximal) and bicipital groove  Neurological Testing     Sensation     Shoulder     Right Shoulder   Intact: light touch    Active Range of Motion     Right Shoulder   Flexion: WFL  Abduction: WFL  Internal rotation BTB: L3 WFL    Strength/Myotome Testing     Right Shoulder     Planes of Motion   Flexion: 4+   Abduction: 4+   External rotation at 0°: 4+   Internal rotation at 0°: 4+     Isolated Muscles   Lower trapezius: 4   Middle trapezius: 4+   Rhomboids: 4     Tests     Right Shoulder   Negative AC shear, Neer's and Speed's             Precautions Anxiety, HTN     Specialty Daily Treatment Diary      Manual                                                                                          Exercise Diary  10/29 10-31  11/5  10/22  10/26   UBE  F/R  x10' x 10'  x10'  5'5'  5'/5' Webslide rows H,M,L  PTBx30 PTB x 30  M PTB x30  PTB x30  PTBx 30   T-band shoulder IR PTB x 30 PTB x 30  PTB x30  PTB x30  PTB x30   Tband shoulder ER PTB x 30 PTB x 30  PTB 30x  PTB 30x  PTB 30x   T-band shoulder extension PTB x 30 PTB x 30  PTB x30  PTB x30  PTB x30   D2 flexion diagonols    D1ext: 2 x 10 GTB, D2 2 flexion: 2 x 10 OTB  D1ext: 2 x 10 GTB, D2 2 flexion: 2 x 10 OTB       Body blade S/I at 90 deg flexion and abd  2x30" 2 x 30"  2x30"  30" x2  30" x2   Shoulder 3 ways         NV         Prone I,Y,T 5# 3x10 5# 3x10  2x10 5#   2x10 5#  3x 10 5#   Prone rows 5# 3x10 5# 3x10  2x10 5#  2x 10 5#  5# 3x10   S/L shoulder ER 5# 3x10 5# 2x10  2x10 5#  2x10 5#  5# 3x10   S/L sleeper stretch        3x 30"  3x 30"   Corner pec stretch        3x 30" Visual cues  3x 30"   Pt education Body mechanics and posture        Body mechanics with proper posture                                                                                             Modalities  10/17  10/26         CP prn right shoulder  np  np

## 2018-11-07 ENCOUNTER — APPOINTMENT (OUTPATIENT)
Dept: PHYSICAL THERAPY | Facility: CLINIC | Age: 58
End: 2018-11-07
Payer: COMMERCIAL

## 2018-11-08 ENCOUNTER — APPOINTMENT (OUTPATIENT)
Dept: PHYSICAL THERAPY | Facility: CLINIC | Age: 58
End: 2018-11-08
Payer: COMMERCIAL

## 2018-11-13 ENCOUNTER — OFFICE VISIT (OUTPATIENT)
Dept: OBGYN CLINIC | Facility: OTHER | Age: 58
End: 2018-11-13
Payer: COMMERCIAL

## 2018-11-13 VITALS
WEIGHT: 225 LBS | SYSTOLIC BLOOD PRESSURE: 144 MMHG | BODY MASS INDEX: 32.28 KG/M2 | DIASTOLIC BLOOD PRESSURE: 92 MMHG | HEART RATE: 67 BPM

## 2018-11-13 DIAGNOSIS — M75.111 INCOMPLETE TEAR OF RIGHT ROTATOR CUFF: ICD-10-CM

## 2018-11-13 DIAGNOSIS — M75.21 BICEPS TENDONITIS ON RIGHT: Primary | ICD-10-CM

## 2018-11-13 PROCEDURE — 99213 OFFICE O/P EST LOW 20 MIN: CPT | Performed by: ORTHOPAEDIC SURGERY

## 2018-11-13 RX ORDER — OXYCODONE HYDROCHLORIDE AND ACETAMINOPHEN 5; 325 MG/1; MG/1
TABLET ORAL
COMMUNITY
End: 2018-11-13 | Stop reason: ALTCHOICE

## 2018-11-13 RX ORDER — VALSARTAN 80 MG/1
TABLET ORAL DAILY
COMMUNITY
End: 2019-02-19

## 2018-11-13 RX ORDER — NAPROXEN 375 MG/1
375 TABLET ORAL 2 TIMES DAILY WITH MEALS
Qty: 180 TABLET | Refills: 0 | Status: SHIPPED | OUTPATIENT
Start: 2018-11-13 | End: 2019-09-11 | Stop reason: HOSPADM

## 2018-11-13 RX ORDER — TOBRAMYCIN AND DEXAMETHASONE 3; 1 MG/ML; MG/ML
SUSPENSION/ DROPS OPHTHALMIC
COMMUNITY

## 2018-11-13 RX ORDER — HYDROCODONE BITARTRATE AND ACETAMINOPHEN 5; 300 MG/1; MG/1
TABLET ORAL
COMMUNITY
End: 2019-02-19

## 2018-11-13 RX ORDER — MELOXICAM 15 MG/1
TABLET ORAL
COMMUNITY
End: 2018-11-13 | Stop reason: ALTCHOICE

## 2018-11-13 RX ORDER — NAPROXEN 500 MG/1
TABLET ORAL
COMMUNITY
End: 2018-11-13 | Stop reason: ALTCHOICE

## 2018-11-13 RX ORDER — POTASSIUM CHLORIDE 20 MEQ/1
TABLET, EXTENDED RELEASE ORAL
COMMUNITY
End: 2019-02-19

## 2018-11-13 RX ORDER — AZELASTINE HYDROCHLORIDE 0.5 MG/ML
SOLUTION/ DROPS OPHTHALMIC
COMMUNITY
End: 2019-02-19

## 2018-11-13 NOTE — PROGRESS NOTES
Assessment  Diagnoses and all orders for this visit:    Biceps tendonitis on right    Incomplete tear of right rotator cuff      Discussion and Plan:    Based on the patient's improvements thus far, it was recommended he continues with his current treatment course  We discussed that he should follow up in 3 months if he feels necessary where we may provide an additional injection or discuss surgical options if need be  A prescription for Naproxen was provided for pain relief PRN  Return in about 3 months (around 2/13/2019) for Recheck  Subjective:   Patient ID: Niraj William is a 62 y o  male      Pt presents today for a 6 weeks follow up for his right shoulder  He reports doing well and marks his improvement at about 75%  The injection he received last visit provided him with considerable relief  He has discontinued formal therapy and transitioned to a HEP  His discomfort occurs when he lifts heavy boxes at work  Denies numbness and tingling  The following portions of the patient's history were reviewed and updated as appropriate: allergies, current medications, past family history, past medical history, past social history, past surgical history and problem list     Review of Systems   Musculoskeletal:        As noted in HPI   All other systems reviewed and are negative  Objective:  Right Shoulder Exam     Tenderness   The patient is experiencing tenderness in the biceps tendon  Range of Motion   Forward Flexion:                        170  External Rotation:                      50  Internal Rotation 0 degrees:      Mid Thoracic    Muscle Strength   External Rotation: 5/5  Supraspinatus:     5/5  Subscapularis:     5/5  Biceps:                 5/5    Tests   Impingement:   Negative  Manzano:          Negative  Drop Arm:        Negative    Comments:  Negative bear hug  Mildly positive Speed's           Physical Exam   Constitutional: He is oriented to person, place, and time   He appears well-developed and well-nourished  HENT:   Head: Normocephalic  Eyes: Pupils are equal, round, and reactive to light  Pulmonary/Chest: Effort normal and breath sounds normal    Neurological: He is alert and oriented to person, place, and time  Skin: Skin is warm and dry  Psychiatric: He has a normal mood and affect  Scribe Attestation    I,:   Adam Mcgregor MA am acting as a scribe while in the presence of the attending physician :        I,:   Mely Suero MD personally performed the services described in this documentation    as scribed in my presence :          Patient has improved significantly with the injection and activity modification, he does not require surgical intervention at this point so he will continue with nonsteroidal anti-inflammatories, we did provided with a refill of his Naprosyn 375 mg which he will run by his primary care physician to make sure this would not interact with any of his medications or his blood pressure    We will see him back in 3 months and consider repeat injection if necessary at that time

## 2018-12-04 ENCOUNTER — OFFICE VISIT (OUTPATIENT)
Dept: OBGYN CLINIC | Facility: OTHER | Age: 58
End: 2018-12-04
Payer: COMMERCIAL

## 2018-12-04 ENCOUNTER — APPOINTMENT (OUTPATIENT)
Dept: RADIOLOGY | Facility: OTHER | Age: 58
End: 2018-12-04
Payer: COMMERCIAL

## 2018-12-04 VITALS
HEART RATE: 71 BPM | DIASTOLIC BLOOD PRESSURE: 95 MMHG | SYSTOLIC BLOOD PRESSURE: 147 MMHG | BODY MASS INDEX: 32.28 KG/M2 | WEIGHT: 225 LBS

## 2018-12-04 DIAGNOSIS — M25.512 ACUTE PAIN OF LEFT SHOULDER: ICD-10-CM

## 2018-12-04 DIAGNOSIS — M25.512 ACUTE PAIN OF LEFT SHOULDER: Primary | ICD-10-CM

## 2018-12-04 DIAGNOSIS — M75.42 IMPINGEMENT SYNDROME OF LEFT SHOULDER: ICD-10-CM

## 2018-12-04 PROCEDURE — 99214 OFFICE O/P EST MOD 30 MIN: CPT | Performed by: ORTHOPAEDIC SURGERY

## 2018-12-04 PROCEDURE — 20610 DRAIN/INJ JOINT/BURSA W/O US: CPT | Performed by: ORTHOPAEDIC SURGERY

## 2018-12-04 PROCEDURE — 73030 X-RAY EXAM OF SHOULDER: CPT

## 2018-12-04 RX ORDER — BETAMETHASONE SODIUM PHOSPHATE AND BETAMETHASONE ACETATE 3; 3 MG/ML; MG/ML
6 INJECTION, SUSPENSION INTRA-ARTICULAR; INTRALESIONAL; INTRAMUSCULAR; SOFT TISSUE
Status: COMPLETED | OUTPATIENT
Start: 2018-12-04 | End: 2018-12-04

## 2018-12-04 RX ORDER — BUPIVACAINE HYDROCHLORIDE 2.5 MG/ML
2 INJECTION, SOLUTION INFILTRATION; PERINEURAL
Status: COMPLETED | OUTPATIENT
Start: 2018-12-04 | End: 2018-12-04

## 2018-12-04 RX ADMIN — BETAMETHASONE SODIUM PHOSPHATE AND BETAMETHASONE ACETATE 6 MG: 3; 3 INJECTION, SUSPENSION INTRA-ARTICULAR; INTRALESIONAL; INTRAMUSCULAR; SOFT TISSUE at 13:37

## 2018-12-04 RX ADMIN — BUPIVACAINE HYDROCHLORIDE 2 ML: 2.5 INJECTION, SOLUTION INFILTRATION; PERINEURAL at 13:37

## 2018-12-04 NOTE — PROGRESS NOTES
Assessment  Diagnoses and all orders for this visit:    Acute pain of left shoulder    Impingement syndrome of left shoulder          Discussion and Plan:  The patient has an examination consistent with subacromial impingement syndrome of the left shoulder along with cervical radiculopathy  I have discussed with the patient the pathophysiology of this diagnosis and reviewed how the examination correlates with this diagnosis  Treatment options were discussed at length and after discussing these treatment options, the patient elected for and received a subacromial injection of corticosteroid (as described in the procedure note)  Patient will do his HEP as previously learned for the right shoulder  We will reevaluate the patient in 6-8 weeks  If the symptoms fail to improve with this treatment the patient would be indicated for further imaging in the form of an MRI scan of the shoulder  Subjective:   Patient ID: Clive Barney is a 62 y o  male      HPI  This is a 62 yr old male here today for evaluation of left shoulder pain  Patient states on 11/14 /18 he was putting snow tires on his car when he felt a sharp pain in the left shoulder  He states he was unable to lift the arm following the injury  Patient feels his strength and ROM has improved since the initial injury but they have not resolved  Patient localized his pain to the anterolateral aspect of the left shoulder  Pain increased with ROM and overhead use and is improved with rest   Patient states pain wakes him from sleep  Night-time pain consists over left sided cervical pain that radiates distally to all 5 digits in the left hand  Radicular pain occurs only at night while sleeping  He has been taking Naprosyn 375 which was given for the right shoulder  Patient denies any numbness, tingling, fever, or chills          The following portions of the patient's history were reviewed and updated as appropriate: allergies, current medications, past family history, past medical history, past social history, past surgical history and problem list     Review of Systems   All other systems reviewed and are negative  Objective:  Left Shoulder Exam     Tenderness   None    Range of Motion   Active Abduction:                       120  Forward Flexion:                        140  External Rotation:                      60  Internal Rotation 0 degrees:      Lumbar    Muscle Strength   Abduction:            4/5  Internal Rotation:  5/5  External Rotation: 4/5    Tests   Impingement:   Positive  Manzano:          Positive    Comments:      No erythema, ecchymosis, or effusion  NVI on exam today    Cervical exam  Decreased cervical extension on exam today  All other planes WNL  Spurling's Negative B/L          Physical Exam   Constitutional: He appears well-developed and well-nourished  HENT:   Head: Normocephalic  Pulmonary/Chest: Effort normal    Skin: Skin is warm and dry  Psychiatric: He has a normal mood and affect  Large joint arthrocentesis  Date/Time: 12/4/2018 1:37 PM  Consent given by: parent  Site marked: site marked  Timeout: Immediately prior to procedure a time out was called to verify the correct patient, procedure, equipment, support staff and site/side marked as required   Supporting Documentation  Indications: pain   Procedure Details  Location: shoulder - L subacromial bursa  Preparation: Patient was prepped and draped in the usual sterile fashion  Needle size: 22 G  Ultrasound guidance: no  Approach: lateral  Medications administered: 2 mL bupivacaine 0 25 %; 6 mg betamethasone acetate-betamethasone sodium phosphate 6 (3-3) mg/mL    Patient tolerance: patient tolerated the procedure well with no immediate complications  Dressing:  Sterile dressing applied        I have personally reviewed pertinent films in PACS and my interpretation is as follows    Left shoulder x-rays:  No fracture or dislocation, AC KAREEM      Scribe Attestation    I,:   Polo Webster am acting as a scribe while in the presence of the attending physician :        I,:   Jace Alvarado MD personally performed the services described in this documentation    as scribed in my presence :

## 2018-12-04 NOTE — PATIENT INSTRUCTIONS

## 2018-12-13 ENCOUNTER — TELEPHONE (OUTPATIENT)
Dept: OBGYN CLINIC | Facility: HOSPITAL | Age: 58
End: 2018-12-13

## 2018-12-13 DIAGNOSIS — M75.42 IMPINGEMENT SYNDROME OF LEFT SHOULDER: Primary | ICD-10-CM

## 2018-12-13 NOTE — TELEPHONE ENCOUNTER
It can take 2 weeks for injection to work  If wants to obtain MRI that is fine as long as his insurance approves

## 2018-12-13 NOTE — TELEPHONE ENCOUNTER
Patient called in requesting a call back  He was seen in the office 12/4 and got a corti injection on the left shoulder  He is requesting to get a mri done  He states he is unbearable pain  Best contact#[phone]  Please advise, thanks

## 2018-12-14 NOTE — TELEPHONE ENCOUNTER
Patient states he would like to proceed with mri  States shot gave him relief and the started to hurt after several days  Also would like a call back after 3:45pm to discuss  I did relay the previous message to him

## 2018-12-17 NOTE — TELEPHONE ENCOUNTER
Patient is calling regarding his MRI  He states that it should be of his neck and not shoulder  He is asking if he needs a new script written?

## 2018-12-18 NOTE — TELEPHONE ENCOUNTER
Called patient at # provided  No answer  Left message  MRI ordered is of Shoulder  Not treating neck just need shoulder MRI for now

## 2018-12-18 NOTE — TELEPHONE ENCOUNTER
Sure, we can give him a referral to the [address] and will enter that referral in [facility] tomorrow for him    Thanks

## 2018-12-18 NOTE — TELEPHONE ENCOUNTER
Pt wants to know if you can refer him to a neck specialist  That is all he said he would like to know   Please call to advise at [phone]

## 2019-02-19 ENCOUNTER — OFFICE VISIT (OUTPATIENT)
Dept: OBGYN CLINIC | Facility: OTHER | Age: 59
End: 2019-02-19
Payer: COMMERCIAL

## 2019-02-19 VITALS
WEIGHT: 215 LBS | SYSTOLIC BLOOD PRESSURE: 119 MMHG | HEART RATE: 74 BPM | HEIGHT: 70 IN | BODY MASS INDEX: 30.78 KG/M2 | DIASTOLIC BLOOD PRESSURE: 81 MMHG

## 2019-02-19 DIAGNOSIS — S46.811D PARTIAL TEAR OF RIGHT SUBSCAPULARIS TENDON, SUBSEQUENT ENCOUNTER: ICD-10-CM

## 2019-02-19 DIAGNOSIS — M75.21 BICEPS TENDINITIS OF RIGHT SHOULDER: ICD-10-CM

## 2019-02-19 DIAGNOSIS — M75.42 IMPINGEMENT SYNDROME OF LEFT SHOULDER: Primary | ICD-10-CM

## 2019-02-19 PROBLEM — S46.811A PARTIAL TEAR OF RIGHT SUBSCAPULARIS TENDON: Status: ACTIVE | Noted: 2018-11-13

## 2019-02-19 PROCEDURE — 99213 OFFICE O/P EST LOW 20 MIN: CPT | Performed by: ORTHOPAEDIC SURGERY

## 2019-02-19 PROCEDURE — 20610 DRAIN/INJ JOINT/BURSA W/O US: CPT | Performed by: ORTHOPAEDIC SURGERY

## 2019-02-19 RX ORDER — BUPIVACAINE HYDROCHLORIDE 2.5 MG/ML
2 INJECTION, SOLUTION INFILTRATION; PERINEURAL
Status: COMPLETED | OUTPATIENT
Start: 2019-02-19 | End: 2019-02-19

## 2019-02-19 RX ORDER — BETAMETHASONE SODIUM PHOSPHATE AND BETAMETHASONE ACETATE 3; 3 MG/ML; MG/ML
6 INJECTION, SUSPENSION INTRA-ARTICULAR; INTRALESIONAL; INTRAMUSCULAR; SOFT TISSUE
Status: COMPLETED | OUTPATIENT
Start: 2019-02-19 | End: 2019-02-19

## 2019-02-19 RX ADMIN — BETAMETHASONE SODIUM PHOSPHATE AND BETAMETHASONE ACETATE 6 MG: 3; 3 INJECTION, SUSPENSION INTRA-ARTICULAR; INTRALESIONAL; INTRAMUSCULAR; SOFT TISSUE at 16:09

## 2019-02-19 RX ADMIN — BUPIVACAINE HYDROCHLORIDE 2 ML: 2.5 INJECTION, SOLUTION INFILTRATION; PERINEURAL at 16:09

## 2019-02-19 NOTE — PATIENT INSTRUCTIONS

## 2019-02-19 NOTE — PROGRESS NOTES
Assessment  Diagnoses and all orders for this visit:    Impingement syndrome of left shoulder    Biceps tendinitis of right shoulder    Partial tear of right subscapularis tendon, subsequent encounter    Discussion and Plan:    Operative versus non operative treatment options were discussed with the patient in detail  This would include surgical repair of his known rotator cuff pathology on the right shoulder and an MRI of the left shoulder to identify any rotator cuff or other pathology causing the patient's symptoms  At this time, the patient wanted to bilateral shoulder injections to provide acute pain relief, since they worked well the last time, and see if his symptoms continue to improve  We will follow up with the patient if he desires if symptoms worsen to discuss either surgical or further imaging options  Patient understands and agrees with the treatment plan      Large joint arthrocentesis: R subacromial bursa  Date/Time: 2/19/2019 4:09 PM  Consent given by: patient  Timeout: Immediately prior to procedure a time out was called to verify the correct patient, procedure, equipment, support staff and site/side marked as required   Supporting Documentation  Indications: pain   Procedure Details  Location: shoulder - R subacromial bursa  Preparation: Patient was prepped and draped in the usual sterile fashion  Needle size: 22 G  Ultrasound guidance: no  Approach: lateral  Medications administered: 2 mL bupivacaine 0 25 %; 6 mg betamethasone acetate-betamethasone sodium phosphate 6 (3-3) mg/mL    Patient tolerance: patient tolerated the procedure well with no immediate complications  Dressing:  Sterile dressing applied    Large joint arthrocentesis: L subacromial bursa  Date/Time: 2/19/2019 4:09 PM  Consent given by: patient  Timeout: Immediately prior to procedure a time out was called to verify the correct patient, procedure, equipment, support staff and site/side marked as required   Supporting Documentation  Indications: pain   Procedure Details  Location: shoulder - L subacromial bursa  Preparation: Patient was prepped and draped in the usual sterile fashion  Needle size: 22 G  Approach: lateral  Medications administered: 2 mL bupivacaine 0 25 %; 6 mg betamethasone acetate-betamethasone sodium phosphate 6 (3-3) mg/mL    Patient tolerance: patient tolerated the procedure well with no immediate complications  Dressing:  Sterile dressing applied        Subjective:   Patient ID: Wade Heard is a 62 y o  male      Patient returns to the clinic with bilateral shoulder pain with today symptoms in right greater than left  Patient notes no new traumatic or twisting injuries since his last visit  A referral was made for an MRI of left shoulder as well as to see a spine and pain specialist at [facility]  Patient did not set up or to either these since he was confused and was expecting a call  Currently, pain in both shoulders is worse after work and at nighttime  The pain does not wake him up from sleep  He has not done physical therapy for either shoulder since the MRI was ordered  No new symptoms reported either  The following portions of the patient's history were reviewed and updated as appropriate: allergies, current medications, past family history, past medical history, past social history, past surgical history and problem list     Review of Systems   Constitutional: Positive for activity change  HENT: Negative  Eyes: Negative  Respiratory: Negative  Cardiovascular: Negative  Endocrine: Negative  Genitourinary: Negative  Musculoskeletal: Positive for arthralgias and myalgias  Skin: Negative  Allergic/Immunologic: Negative  Neurological: Negative  Hematological: Negative  Psychiatric/Behavioral: Negative  Objective:  Right Shoulder Exam     Tenderness   Right shoulder tenderness location: Anterior shoulder      Range of Motion   Active abduction: 170   External rotation: 80   Forward flexion: 170   Internal rotation 0 degrees: Lumbar     Muscle Strength   External rotation: 5/5   Supraspinatus: 5/5   Subscapularis: 4/5     Tests   Manzano test: positive  Impingement: positive      Left Shoulder Exam     Tenderness   Left shoulder tenderness location: Anterior shoulder  Range of Motion   Active abduction: 170   External rotation: 80   Forward flexion: 170   Internal rotation 0 degrees: Lumbar     Muscle Strength   External rotation: 4/5   Supraspinatus: 3/5   Subscapularis: 5/5     Tests   Manzano test: positive  Cross arm: positive  Impingement: positive            Physical Exam   Constitutional: He is oriented to person, place, and time  He appears well-developed  HENT:   Head: Normocephalic  Eyes: Conjunctivae are normal    Cardiovascular:   No audible murmurs   Pulmonary/Chest: Effort normal    Neurological: He is alert and oriented to person, place, and time  I personally examined the patient and reviewed the history provided  I agree with the note and the assessment and plan by Dr Ronnell Severs, MD      Plan:    Patient continues to be symptomatic in both shoulders but does state he significantly better than we initiated treatment  He will continue with his nonoperative care we provided him with bilateral subacromial injections and he will travel to [place] where he will be helping care for his brother who is ill with cancer  If he wishes further evaluation an MRI left shoulder can be obtained and surgical intervention for the right shoulder can always be considered

## 2019-02-22 ENCOUNTER — TELEPHONE (OUTPATIENT)
Dept: OBGYN CLINIC | Facility: HOSPITAL | Age: 59
End: 2019-02-22

## 2019-02-22 NOTE — TELEPHONE ENCOUNTER
I left a detailed msg on patient's voicemail that steroid injections take 1-2 weeks to work  Give it another week or 2 and call office if no improvement  Continue with Naproxen BID, Tylenol 1000mg TID, ice 20 min on 20 min off  Call office if not improvement 1-2 weeks

## 2019-02-22 NOTE — TELEPHONE ENCOUNTER
The patient called in and said that he had an injection on 2/19/19 with Dr Juan David Riley and is still in pain and would like to speak with a nurse, regarding what he should do  Patient said that he can be reached until 2:15 at his cellphone# [phone], after that he won't be available until 4:15 after work  Please advise      Dorma Aase [phone]

## 2019-03-07 NOTE — TELEPHONE ENCOUNTER
Patient calling back to state cortisone injection didn't work and he is still in pain  He would like to speak to the nurse  He would also like to proceed with surgery, I scheduled him for an appt for 03/19/19      Callback [phone]

## 2019-03-08 NOTE — TELEPHONE ENCOUNTER
Patient called back he is unable to get out of work today 03/08 I schedule patient with Dr Cindy Holcomb for Monday 03/11 at [address] [place] [address]

## 2019-03-10 DIAGNOSIS — K21.9 CHRONIC GERD: ICD-10-CM

## 2019-03-10 RX ORDER — LANSOPRAZOLE 30 MG/1
CAPSULE, DELAYED RELEASE ORAL
Qty: 90 CAPSULE | Refills: 2 | Status: SHIPPED | OUTPATIENT
Start: 2019-03-10 | End: 2019-03-13 | Stop reason: SDUPTHER

## 2019-03-13 DIAGNOSIS — K21.9 CHRONIC GERD: ICD-10-CM

## 2019-03-13 RX ORDER — LANSOPRAZOLE 30 MG/1
30 CAPSULE, DELAYED RELEASE ORAL DAILY
Qty: 90 CAPSULE | Refills: 2 | Status: SHIPPED | OUTPATIENT
Start: 2019-03-13 | End: 2019-11-25 | Stop reason: SDUPTHER

## 2019-08-27 ENCOUNTER — OFFICE VISIT (OUTPATIENT)
Dept: OBGYN CLINIC | Facility: OTHER | Age: 59
End: 2019-08-27
Payer: COMMERCIAL

## 2019-08-27 VITALS
HEART RATE: 74 BPM | DIASTOLIC BLOOD PRESSURE: 82 MMHG | BODY MASS INDEX: 33.79 KG/M2 | WEIGHT: 236 LBS | HEIGHT: 70 IN | SYSTOLIC BLOOD PRESSURE: 130 MMHG

## 2019-08-27 DIAGNOSIS — S46.811D PARTIAL TEAR OF RIGHT SUBSCAPULARIS TENDON, SUBSEQUENT ENCOUNTER: Primary | ICD-10-CM

## 2019-08-27 DIAGNOSIS — M25.511 RIGHT SHOULDER PAIN, UNSPECIFIED CHRONICITY: ICD-10-CM

## 2019-08-27 PROCEDURE — 99214 OFFICE O/P EST MOD 30 MIN: CPT | Performed by: ORTHOPAEDIC SURGERY

## 2019-08-27 NOTE — PATIENT INSTRUCTIONS
You are being scheduled for a shoulder arthroscopy to treat your symptoms  Below are some instructions and information on what to expect before and after your surgery  Pre-Surgical Preparation for Arthroscopic Shoulder Surgery: You will be contacted the evening prior to your surgery to confirm the scheduled time of the procedure and when to arrive at the hospital    Do not eat or drink anything after midnight the night before your surgery  Since you are having out-patient surgery, make sure that you have someone who can drive you home later in the day  Also, prepare that person for a long day, as the process of safely preparing for and recovering from the procedure is more time consuming than the actual procedure! As you will be in a sling after surgery, please wear or bring a loose fitting button-down shirt so that you can easily place this over the sling when you leave the surgical suite  This avoids having to place the operative arm in a sleeve  Most patients find that this is the easiest outfit to wear for the first week or so after surgery so you may want to plan accordingly  Most patients find that lying down in bed after shoulder surgery accentuates their discomfort  This is likely related to the effect of gravity on the swelling in the shoulder  As a result, most patients sleep better in a recliner or in bed with pillows propped up behind their back for the first few days or weeks after surgery  It is a good idea to plan for this ahead of time so there will be less hassle getting things set up the night after surgery  What to Expect After Arthroscopic Shoulder Surgery: It is normal to have swelling and discomfort in the shoulder for several days or a week after surgery  It is also normal to have a small amount of drainage from the surgical wounds (especially the first few days after surgery), as we put fluid into the shoulder to visualize the structures during surgery  It is NOT normal to have foul smelling, purulent drainage and if this is noted, please contact the office immediately or proceed to the emergency room for evaluation as this may indicate an infection  Applying ice bags to the shoulder may help with pain that is not controlled by the regional block  Ice should be applied 20-30 minutes at a time, every hour or two  Make sure to put a thin towel or T-shirt next to your skin to avoid direct contact of the ice with the skin  Icing is most helpful in the first 48 hours, although many people find that continuing past this time frame lessens their postoperative pain  Please note that your post-operative dressing is not conductive to ice, so if you need to, it is okay to remove that dressing even the night after surgery and place band-aids over the wounds in order for the ice to take effect  Pain Control    Most patients will receive a nerve block, the local anesthetic may keep your whole arm numb for up to 4 days  You will be given a prescription for narcotic pain medication when you are discharged from the hospital   With the newer nerve block that is being utilized, patients are rarely requiring the use of this narcotic pain medication  If you find you do not tolerate that type of pain medicine well, call our office and we will try another one  In addition to the narcotic pain medication, it is safe to use an anti-inflammatory (unless the patient has a medical condition that would not allow safe use of this mediation)  This includes the Advil, Motrin, Ibuprofen and Alleve category of medications  Simply follow the over the counter dosing on the package and use as indicated as another adjunct  Importantly since these medications are all very similar, use only one of them  Tylenol is a separate medication that can be utilized as well and can be taken at the same time as the other medication or given in a "staggered" manner    Just make sure that you follow the dosing on the over the counter bottle instructions  Also make sure that the pain medication prescribed by Dr Amparo Lemus team does not contain acetaminophen (this is found in Percocet and Vicodin)  Typically we do not prescribe those types of pain medications but if for some reason that has been prescribed DO NOT add more Tylenol (acetaminophen) as you could end up taking too much of that medication  As mentioned above, most patients find that lying down accentuates their discomfort  You might sleep better in a recliner, or propped up in bed  Dr Linsey Sequeira encourages patients to safely ambulate around the house as much as possible in the first few days after the procedure as this can help with blood circulation in the legs  While the incidence of blood clots is very rare following shoulder surgery, early ambulation is a great way to help decrease the already low rate  24 hours after the surgery you may remove the bandage and cover incisions with Band-Aids if needed  At that time you may shower, the wounds will have a surgical glue that will protect them from shower water but do not submerge your incisions directly (bathing or swimming) until at least 2 weeks post-operatively  It is safe to let the arm hang at the side and take a shower and put on a shirt without the sling on  Just make sure that you do not use the operative are to reach out and grab anything as that may damage the repair  When you are done showering and getting dressed please return the operative arm to the sling  Unless noted otherwise in your discharge paperwork, Dr Linsey Sequeira uses absorbable sutures so they do not need to be removed  Dr Casper Lucas physician assistant (PA) will see you in the office a few days after the procedure to review the intra-operative findings and to initiate physical therapy if appropriate    A post-operative appointment should have been scheduled for you already, but if for some reason this did not happen, please call the office to make one  Physical therapy is important after nearly all shoulder surgeries and a detailed rehabilitation plan based on the specific intra-operative findings and procedures will be provided to your therapist at the first post-operative office visit  Most patients have post-operative therapy appointments scheduled pre-operatively, but if you do not, that will be handled at the first post-operative office visit  Unless expressly directed otherwise it is safe to remove the sling even the first day after the surgery and let the arm hang by the side  This allows patients to shower and even put a shirt on (bad arm in the sleeve first)  It is also safe to flex and extend their wrist, hand and fingers as much as possible when the block wears off  These simple motions can serve to pump fluid out of the forearm and decrease swelling in the arm

## 2019-08-27 NOTE — PROGRESS NOTES
Benji Bond MD personally examined the patient and reviewed the history provided  I agree with the note and the assessment and plan by Maria Luisa Woods PA-C  Assessment  Diagnoses and all orders for this visit:    Partial tear of right subscapularis tendon, subsequent encounter    Long Head biceps subluxation        Discussion and Plan:    Elaine Devries has a superior border subscapularis tear and long head bicep instability which has failed to improve with prolonged nonoperative care  He is certainly indicated for arthroscopic treatment of his pathology in the form of arthroscopic rotator cuff repair with long-head biceps tenodesis  A thorough discussion was performed with the patient regarding the risks and benefit of operative and nonoperative treatment of their rotator cuff tear  Risks discussed include but were not limited to infection, neurovascular injury, recurrent tear, nonhealing of the repair, need for further surgery, need for biceps tenodesis or tenotomy, stiffness, need for prolonged rehabilitation, as well as the risk of anesthesia  After this discussion all questions were answered and informed consent was obtained in the office for arthroscopic rotator cuff repair with long-head biceps tenodesis of the right shoulder         Subjective:   Patient ID: Say Duran is a 62 y o  male      Hiren presents to the office with a chief complaint of right shoulder pain  He has formal physical therapy, several cortisone injections and continues to have pain  He recently moved so this is the first opportunity he had to see us  He continues to have intermittent shoulder pain that interferes with activities and work  He has pain with reaching, pushing and pulling  Pain is sharp and sudden  He admits to dull aches at rest   He denies new injury or trauma  Over the last month or so he states the pain has become worsen  He denies numbness or tingling      Patient has a known superior border subscapularis tear with bicep instability  We suggested surgery at several visits, but issues at work and recently moving prevented him from scheduling  /82   Pulse 74   Ht 5' 10" (1 778 m)   Wt 107 kg (236 lb)   BMI 33 86 kg/m²     The following portions of the patient's history were reviewed and updated as appropriate: allergies, current medications, past family history, past medical history, past social history, past surgical history and problem list     Review of Systems   Constitutional: Negative for chills and fever  HENT: Negative for hearing loss  Eyes: Negative for visual disturbance  Respiratory: Negative for shortness of breath  Cardiovascular: Negative for chest pain  Gastrointestinal: Negative for abdominal pain  Musculoskeletal:        As reviewed in the HPI   Skin: Negative for rash  Neurological:        As reviewed in the HPI   Psychiatric/Behavioral: Negative for agitation  Objective:  Right Shoulder Exam     Tenderness   The patient is experiencing no tenderness  Range of Motion   The patient has normal right shoulder ROM  Muscle Strength   External rotation: 5/5   Supraspinatus: 5/5   Subscapularis: 3/5     Tests   Manzano test: positive  Cross arm: negative  Impingement: positive  Drop arm: negative    Other   Erythema: absent  Sensation: normal  Pulse: present    Comments:  Positive Speeds  Positive [place]riens            Physical Exam   Constitutional: He is oriented to person, place, and time  He appears well-developed and well-nourished  HENT:   Head: Normocephalic and atraumatic  Eyes: EOM are normal    Neck: Normal range of motion  Neck supple  Cardiovascular: Normal rate, regular rhythm and normal heart sounds  Pulmonary/Chest: Effort normal and breath sounds normal  No respiratory distress  He has no wheezes  Abdominal: Soft  He exhibits no distension  Neurological: He is alert and oriented to person, place, and time  Skin: Skin is warm and dry  Psychiatric: He has a normal mood and affect  His behavior is normal  Judgment and thought content normal    Nursing note and vitals reviewed  I have personally reviewed pertinent films in PACS and my interpretation is as follows      MRI from outside facility shows superior border subscapularis tear and long head bicep subluxation

## 2019-08-29 ENCOUNTER — ANESTHESIA EVENT (OUTPATIENT)
Dept: PERIOP | Facility: AMBULARY SURGERY CENTER | Age: 59
End: 2019-08-29
Payer: COMMERCIAL

## 2019-09-04 NOTE — PRE-PROCEDURE INSTRUCTIONS
Pre-Surgery Instructions:   Medication Instructions    ALPRAZolam (XANAX) 0 5 mg tablet Instructed patient per Anesthesia Guidelines   irbesartan-hydrochlorothiazide (AVALIDE) 150-12 5 MG per tablet Instructed patient per Anesthesia Guidelines   lansoprazole (PREVACID) 30 mg capsule Instructed patient per Anesthesia Guidelines   Multiple Vitamins-Minerals (MULTIVITAMIN ADULT PO) Patient was instructed by Physician and understands   naproxen (NAPROSYN) 375 mg tablet Patient was instructed by Physician and understands   tobramycin-dexamethasone (TOBRADEX) ophthalmic suspension Instructed patient per Anesthesia Guidelines  Pre op and bathing instructions reviewed  Pt will get hibiclens  Pt will bring sling DOS

## 2019-09-09 ENCOUNTER — EVALUATION (OUTPATIENT)
Dept: PHYSICAL THERAPY | Facility: CLINIC | Age: 59
End: 2019-09-09
Payer: COMMERCIAL

## 2019-09-09 DIAGNOSIS — S46.811D PARTIAL TEAR OF RIGHT SUBSCAPULARIS TENDON, SUBSEQUENT ENCOUNTER: ICD-10-CM

## 2019-09-09 PROCEDURE — G8984 CARRY CURRENT STATUS: HCPCS | Performed by: PHYSICAL THERAPIST

## 2019-09-09 PROCEDURE — 97161 PT EVAL LOW COMPLEX 20 MIN: CPT | Performed by: PHYSICAL THERAPIST

## 2019-09-09 PROCEDURE — G8985 CARRY GOAL STATUS: HCPCS | Performed by: PHYSICAL THERAPIST

## 2019-09-09 NOTE — PROGRESS NOTES
PT Evaluation     Today's date: 2019  Patient name: Everette Cooley  : 1960  MRN: [id number]  Referring provider: Omaira Valdes PA-C  Dx:   Encounter Diagnosis     ICD-10-CM    1  Partial tear of right subscapularis tendon, subsequent encounter S43 [id number] Ambulatory referral to Physical Therapy                  Assessment  Assessment details: Everette Cooley is a pleasant 62 y o  male who presents for a pre-operative visit for a R rotator cuff repair on   The patient's greatest concerns are getting back to work as quickly as possible  Pt was instructed on post operative care and donning/doffing the sling  He was additionally instructed on sling maintance and post operative exercises that for wrist mobility and scap retraction  He was given education on the course of physical therapy  Pt verbalized and demonstrated understanding  No further referral appears necessary at this time based upon examination results  Impairments: abnormal or restricted ROM, abnormal movement, activity intolerance, impaired physical strength, lacks appropriate home exercise program, pain with function, poor posture  and poor body mechanics  Functional limitations: unable to work without pain, unable to sleep without pain   Symptom irritability: lowBarriers to therapy: Co-insurance   Understanding of Dx/Px/POC: good   Prognosis: fair  Prognosis details: Positive prognostic indicators include positive attitude toward recovery  Negative prognostic indicators include co-insurance    Goals  ST  Independent with HEP in 2 weeks  2  Pt will have verbal report of improvement in symptoms by >/=25% in 2 weeks   3  Pt will be able to don/doff sling independently in 2 weeks   LT  Pt will be able to sleep through the night by time of d/c   2   Pt will be able to return to work by time of d/c       Plan  Plan details: Prognosis above is given PT services 2x/week tapering to 1x/week over the next 2 months and home program adherence  Patient would benefit from: skilled physical therapy  Planned modality interventions: thermotherapy: hydrocollator packs  Planned therapy interventions: activity modification, joint mobilization, manual therapy, motor coordination training, neuromuscular re-education, patient education, self care, therapeutic activities, therapeutic exercise, graded activity, home exercise program, behavior modification, strengthening, stretching, functional ROM exercises and postural training  Frequency: 1x week  Treatment plan discussed with: patient        Subjective Evaluation    History of Present Illness  Mechanism of injury: Pt frozen  who presents for a pre-operative  He has to be able to lift up to 25lbs to return to work  Pt has had shoulder pain for about 3 years  He has recently undergone a course of physical therapy and was referred back to his physician  He is scheduled for surgery on   Pain  Current pain ratin  At best pain ratin  At worst pain ratin  Quality: sharp    Patient Goals  Patient goals for therapy: return to work and decreased pain  Patient goal: be able to carry for awhile without pain,         Objective     Postural Observations  Seated posture: poor  Standing posture: poor        Active Range of Motion   Left Shoulder   Flexion: 170 degrees   Extension: 150 degrees   External rotation BTH: Active external rotation behind the head: occiput     Internal rotation BTB: T12     Right Shoulder   Flexion: 150 degrees   Extension: 53 degrees   Abduction: 130 degrees   External rotation BTH: T3   Internal rotation BTB: L3 with pain    Strength/Myotome Testing     Left Shoulder     Planes of Motion   Flexion: 5   Extension: 5   Abduction: 4-   External rotation at 0°: 5   Internal rotation at 0°: 5     Right Shoulder     Planes of Motion   Flexion: 4+   Extension: 4+   Abduction: 4+   External rotation at 0°: 5   Internal rotation at 0°: 5       Flowsheet Rows Most Recent Value   PT/OT G-Codes   Current Score  53   Projected Score  67             Precautions: anxiety, HTN       Manual  9/9                                                                                 Exercise Diary  9/9                                                                                                                                                                                                                                                                                    Modalities

## 2019-09-11 ENCOUNTER — HOSPITAL ENCOUNTER (OUTPATIENT)
Facility: AMBULARY SURGERY CENTER | Age: 59
Setting detail: OUTPATIENT SURGERY
Discharge: HOME | End: 2019-09-11
Attending: ORTHOPAEDIC SURGERY | Admitting: ORTHOPAEDIC SURGERY
Payer: COMMERCIAL

## 2019-09-11 ENCOUNTER — ANESTHESIA (OUTPATIENT)
Dept: PERIOP | Facility: AMBULARY SURGERY CENTER | Age: 59
End: 2019-09-11
Payer: COMMERCIAL

## 2019-09-11 VITALS
HEIGHT: 70 IN | TEMPERATURE: 97.3 F | DIASTOLIC BLOOD PRESSURE: 78 MMHG | SYSTOLIC BLOOD PRESSURE: 128 MMHG | RESPIRATION RATE: 16 BRPM | HEART RATE: 66 BPM | BODY MASS INDEX: 33.36 KG/M2 | WEIGHT: 233 LBS | OXYGEN SATURATION: 99 %

## 2019-09-11 DIAGNOSIS — M75.21 BICEPS TENDINITIS OF RIGHT SHOULDER: ICD-10-CM

## 2019-09-11 DIAGNOSIS — S46.811D PARTIAL TEAR OF RIGHT SUBSCAPULARIS TENDON, SUBSEQUENT ENCOUNTER: Primary | ICD-10-CM

## 2019-09-11 PROCEDURE — C1713 ANCHOR/SCREW BN/BN,TIS/BN: HCPCS | Performed by: ORTHOPAEDIC SURGERY

## 2019-09-11 PROCEDURE — 29827 SHO ARTHRS SRG RT8TR CUF RPR: CPT | Performed by: ORTHOPAEDIC SURGERY

## 2019-09-11 PROCEDURE — 99024 POSTOP FOLLOW-UP VISIT: CPT | Performed by: ORTHOPAEDIC SURGERY

## 2019-09-11 PROCEDURE — 29826 SHO ARTHRS SRG DECOMPRESSION: CPT | Performed by: PHYSICIAN ASSISTANT

## 2019-09-11 PROCEDURE — C9290 INJ, BUPIVACAINE LIPOSOME: HCPCS | Performed by: STUDENT IN AN ORGANIZED HEALTH CARE EDUCATION/TRAINING PROGRAM

## 2019-09-11 PROCEDURE — 29828 SHO ARTHRS SRG BICP TENODSIS: CPT | Performed by: ORTHOPAEDIC SURGERY

## 2019-09-11 PROCEDURE — 29827 SHO ARTHRS SRG RT8TR CUF RPR: CPT | Performed by: PHYSICIAN ASSISTANT

## 2019-09-11 PROCEDURE — 29828 SHO ARTHRS SRG BICP TENODSIS: CPT | Performed by: PHYSICIAN ASSISTANT

## 2019-09-11 PROCEDURE — 29826 SHO ARTHRS SRG DECOMPRESSION: CPT | Performed by: ORTHOPAEDIC SURGERY

## 2019-09-11 DEVICE — DEPUY MITEK HEALIX ADVANCE 4.5 BR: Type: IMPLANTABLE DEVICE | Site: SHOULDER | Status: FUNCTIONAL

## 2019-09-11 RX ORDER — CEFAZOLIN SODIUM 2 G/50ML
2000 SOLUTION INTRAVENOUS ONCE
Status: COMPLETED | OUTPATIENT
Start: 2019-09-11 | End: 2019-09-11

## 2019-09-11 RX ORDER — FENTANYL CITRATE/PF 50 MCG/ML
25 SYRINGE (ML) INJECTION
Status: DISCONTINUED | OUTPATIENT
Start: 2019-09-11 | End: 2019-09-11 | Stop reason: HOSPADM

## 2019-09-11 RX ORDER — METOCLOPRAMIDE HYDROCHLORIDE 5 MG/ML
10 INJECTION INTRAMUSCULAR; INTRAVENOUS ONCE AS NEEDED
Status: DISCONTINUED | OUTPATIENT
Start: 2019-09-11 | End: 2019-09-11 | Stop reason: HOSPADM

## 2019-09-11 RX ORDER — DEXAMETHASONE SODIUM PHOSPHATE 10 MG/ML
INJECTION, SOLUTION INTRAMUSCULAR; INTRAVENOUS AS NEEDED
Status: DISCONTINUED | OUTPATIENT
Start: 2019-09-11 | End: 2019-09-11 | Stop reason: SURG

## 2019-09-11 RX ORDER — ACETAMINOPHEN 325 MG/1
650 TABLET ORAL EVERY 6 HOURS PRN
Status: DISCONTINUED | OUTPATIENT
Start: 2019-09-11 | End: 2019-09-11 | Stop reason: HOSPADM

## 2019-09-11 RX ORDER — OXYCODONE HYDROCHLORIDE 5 MG/1
TABLET ORAL
Qty: 13 TABLET | Refills: 0 | Status: SHIPPED | OUTPATIENT
Start: 2019-09-11 | End: 2019-11-11

## 2019-09-11 RX ORDER — LIDOCAINE HYDROCHLORIDE 10 MG/ML
0.5 INJECTION, SOLUTION EPIDURAL; INFILTRATION; INTRACAUDAL; PERINEURAL ONCE AS NEEDED
Status: DISCONTINUED | OUTPATIENT
Start: 2019-09-11 | End: 2019-09-11 | Stop reason: HOSPADM

## 2019-09-11 RX ORDER — ONDANSETRON 2 MG/ML
INJECTION INTRAMUSCULAR; INTRAVENOUS AS NEEDED
Status: DISCONTINUED | OUTPATIENT
Start: 2019-09-11 | End: 2019-09-11 | Stop reason: SURG

## 2019-09-11 RX ORDER — HYDROMORPHONE HCL/PF 1 MG/ML
0.5 SYRINGE (ML) INJECTION
Status: DISCONTINUED | OUTPATIENT
Start: 2019-09-11 | End: 2019-09-11 | Stop reason: HOSPADM

## 2019-09-11 RX ORDER — SODIUM CHLORIDE, SODIUM LACTATE, POTASSIUM CHLORIDE, CALCIUM CHLORIDE 600; 310; 30; 20 MG/100ML; MG/100ML; MG/100ML; MG/100ML
125 INJECTION, SOLUTION INTRAVENOUS CONTINUOUS
Status: DISCONTINUED | OUTPATIENT
Start: 2019-09-11 | End: 2019-09-11 | Stop reason: HOSPADM

## 2019-09-11 RX ORDER — ONDANSETRON 2 MG/ML
4 INJECTION INTRAMUSCULAR; INTRAVENOUS EVERY 6 HOURS PRN
Status: DISCONTINUED | OUTPATIENT
Start: 2019-09-11 | End: 2019-09-11 | Stop reason: HOSPADM

## 2019-09-11 RX ORDER — LIDOCAINE HYDROCHLORIDE 10 MG/ML
INJECTION, SOLUTION INFILTRATION; PERINEURAL
Status: COMPLETED | OUTPATIENT
Start: 2019-09-11 | End: 2019-09-11

## 2019-09-11 RX ORDER — MIDAZOLAM HYDROCHLORIDE 1 MG/ML
INJECTION INTRAMUSCULAR; INTRAVENOUS
Status: COMPLETED | OUTPATIENT
Start: 2019-09-11 | End: 2019-09-11

## 2019-09-11 RX ORDER — KETOROLAC TROMETHAMINE 30 MG/ML
INJECTION, SOLUTION INTRAMUSCULAR; INTRAVENOUS AS NEEDED
Status: DISCONTINUED | OUTPATIENT
Start: 2019-09-11 | End: 2019-09-11 | Stop reason: SURG

## 2019-09-11 RX ORDER — OXYCODONE HYDROCHLORIDE 5 MG/1
5 TABLET ORAL EVERY 4 HOURS PRN
Status: DISCONTINUED | OUTPATIENT
Start: 2019-09-11 | End: 2019-09-11 | Stop reason: HOSPADM

## 2019-09-11 RX ORDER — LIDOCAINE HYDROCHLORIDE 10 MG/ML
INJECTION, SOLUTION INFILTRATION; PERINEURAL AS NEEDED
Status: DISCONTINUED | OUTPATIENT
Start: 2019-09-11 | End: 2019-09-11 | Stop reason: SURG

## 2019-09-11 RX ORDER — ONDANSETRON 2 MG/ML
4 INJECTION INTRAMUSCULAR; INTRAVENOUS ONCE AS NEEDED
Status: DISCONTINUED | OUTPATIENT
Start: 2019-09-11 | End: 2019-09-11 | Stop reason: HOSPADM

## 2019-09-11 RX ORDER — BUPIVACAINE HYDROCHLORIDE 5 MG/ML
INJECTION, SOLUTION PERINEURAL
Status: COMPLETED | OUTPATIENT
Start: 2019-09-11 | End: 2019-09-11

## 2019-09-11 RX ORDER — PROPOFOL 10 MG/ML
INJECTION, EMULSION INTRAVENOUS AS NEEDED
Status: DISCONTINUED | OUTPATIENT
Start: 2019-09-11 | End: 2019-09-11 | Stop reason: SURG

## 2019-09-11 RX ADMIN — BUPIVACAINE 20 ML: 13.3 INJECTION, SUSPENSION, LIPOSOMAL INFILTRATION at 10:29

## 2019-09-11 RX ADMIN — ONDANSETRON 4 MG: 2 INJECTION INTRAMUSCULAR; INTRAVENOUS at 12:10

## 2019-09-11 RX ADMIN — BUPIVACAINE HYDROCHLORIDE 5 ML: 5 INJECTION, SOLUTION PERINEURAL at 10:30

## 2019-09-11 RX ADMIN — DEXAMETHASONE SODIUM PHOSPHATE 8 MG: 10 INJECTION, SOLUTION INTRAMUSCULAR; INTRAVENOUS at 11:38

## 2019-09-11 RX ADMIN — CEFAZOLIN SODIUM 2000 MG: 2 SOLUTION INTRAVENOUS at 11:29

## 2019-09-11 RX ADMIN — PROPOFOL 200 MG: 10 INJECTION, EMULSION INTRAVENOUS at 11:32

## 2019-09-11 RX ADMIN — LIDOCAINE HYDROCHLORIDE 0.5 ML: 10 INJECTION, SOLUTION INFILTRATION; PERINEURAL at 10:30

## 2019-09-11 RX ADMIN — SODIUM CHLORIDE, SODIUM LACTATE, POTASSIUM CHLORIDE, AND CALCIUM CHLORIDE 125 ML/HR: .6; .31; .03; .02 INJECTION, SOLUTION INTRAVENOUS at 10:00

## 2019-09-11 RX ADMIN — MIDAZOLAM HYDROCHLORIDE 2 MG: 1 INJECTION, SOLUTION INTRAMUSCULAR; INTRAVENOUS at 10:30

## 2019-09-11 RX ADMIN — KETOROLAC TROMETHAMINE 30 MG: 30 INJECTION, SOLUTION INTRAMUSCULAR at 12:14

## 2019-09-11 RX ADMIN — LIDOCAINE HYDROCHLORIDE 50 MG: 10 INJECTION, SOLUTION INFILTRATION; PERINEURAL at 11:32

## 2019-09-11 NOTE — ANESTHESIA PREPROCEDURE EVALUATION
Review of Systems/Medical History  Patient summary reviewed  Chart reviewed  No history of anesthetic complications     Cardiovascular  Exercise tolerance (METS): >4,  Hypertension ,    Pulmonary  Sleep apnea CPAP,        GI/Hepatic    GERD well controlled,        Negative  ROS        Endo/Other    Obesity (BMI 33)    GYN       Hematology  Negative hematology ROS      Musculoskeletal  Negative musculoskeletal ROS        Neurology  Negative neurology ROS      Psychology   Anxiety,              Physical Exam    Airway    Mallampati score: III  TM Distance: >3 FB  Neck ROM: full     Dental   No notable dental hx     Cardiovascular      Pulmonary      Other Findings       Lab Results   Component Value Date    WBC 5 36 09/12/2018    HGB 15 9 09/12/2018     09/12/2018     Lab Results   Component Value Date    SODIUM 136 09/12/2018    K 3 5 09/12/2018    BUN 15 09/12/2018    CREATININE 0 88 09/12/2018    EGFR 95 09/12/2018     Anesthesia Plan  ASA Score- 2     Anesthesia Type- general and regional with ASA Monitors  Additional Monitors:   Airway Plan: LMA  Plan Factors-    Induction- intravenous  Postoperative Plan-     Informed Consent- Anesthetic plan and risks discussed with patient and spouse  I personally reviewed this patient with the CRNA  Discussed and agreed on the Anesthesia Plan with the CRNA  Es oMjica

## 2019-09-11 NOTE — H&P
I identified and marked the patient in the pre-op holding area after confirming the surgical consent  No changes to medical health since the H&P was preformed  The [facility] ensure compliance with applicable state laws

## 2019-09-11 NOTE — DISCHARGE INSTRUCTIONS
You are being scheduled for a shoulder arthroscopy to treat your symptoms  Below are some instructions and information on what to expect before and after your surgery  Pre-Surgical Preparation for Arthroscopic Shoulder Surgery: You will be contacted the evening prior to your surgery to confirm the scheduled time of the procedure and when to arrive at the hospital    Do not eat or drink anything after midnight the night before your surgery  Since you are having out-patient surgery, make sure that you have someone who can drive you home later in the day  Also, prepare that person for a long day, as the process of safely preparing for and recovering from the procedure is more time consuming than the actual procedure! As you will be in a sling after surgery, please wear or bring a loose fitting button-down shirt so that you can easily place this over the sling when you leave the surgical suite  This avoids having to place the operative arm in a sleeve  Most patients find that this is the easiest outfit to wear for the first week or so after surgery so you may want to plan accordingly  Most patients find that lying down in bed after shoulder surgery accentuates their discomfort  This is likely related to the effect of gravity on the swelling in the shoulder  As a result, most patients sleep better in a recliner or in bed with pillows propped up behind their back for the first few days or weeks after surgery  It is a good idea to plan for this ahead of time so there will be less hassle getting things set up the night after surgery  What to Expect After Arthroscopic Shoulder Surgery: It is normal to have swelling and discomfort in the shoulder for several days or a week after surgery  It is also normal to have a small amount of drainage from the surgical wounds (especially the first few days after surgery), as we put fluid into the shoulder to visualize the structures during surgery  It is NOT normal to have foul smelling, purulent drainage and if this is noted, please contact the office immediately or proceed to the emergency room for evaluation as this may indicate an infection  Applying ice bags to the shoulder may help with pain that is not controlled by the regional block  Ice should be applied 20-30 minutes at a time, every hour or two  Make sure to put a thin towel or T-shirt next to your skin to avoid direct contact of the ice with the skin  Icing is most helpful in the first 48 hours, although many people find that continuing past this time frame lessens their postoperative pain  Please note that your post-operative dressing is not conductive to ice, so if you need to, it is okay to remove that dressing even the night after surgery and place band-aids over the wounds in order for the ice to take effect  Pain Control    Most patients will receive a nerve block, the local anesthetic may keep your whole arm numb for up to 4 days  You will be given a prescription for narcotic pain medication when you are discharged from the hospital   With the newer nerve block that is being utilized, patients are rarely requiring the use of this narcotic pain medication  If you find you do not tolerate that type of pain medicine well, call our office and we will try another one  In addition to the narcotic pain medication, it is safe to use an anti-inflammatory (unless the patient has a medical condition that would not allow safe use of this mediation)  This includes the Advil, Motrin, Ibuprofen and Alleve category of medications  Simply follow the over the counter dosing on the package and use as indicated as another adjunct  Importantly since these medications are all very similar, use only one of them  Tylenol is a separate medication that can be utilized as well and can be taken at the same time as the other medication or given in a "staggered" manner    Just make sure that you follow the dosing on the over the counter bottle instructions  Also make sure that the pain medication prescribed by Dr Mikaela Haas team does not contain acetaminophen (this is found in Percocet and Vicodin)  Typically we do not prescribe those types of pain medications but if for some reason that has been prescribed DO NOT add more Tylenol (acetaminophen) as you could end up taking too much of that medication  As mentioned above, most patients find that lying down accentuates their discomfort  You might sleep better in a recliner, or propped up in bed  Dr Dami Muniz encourages patients to safely ambulate around the house as much as possible in the first few days after the procedure as this can help with blood circulation in the legs  While the incidence of blood clots is very rare following shoulder surgery, early ambulation is a great way to help decrease the already low rate  24 hours after the surgery you may remove the bandage and cover incisions with Band-Aids if needed  At that time you may shower, the wounds will have a surgical glue that will protect them from shower water but do not submerge your incisions directly (bathing or swimming) until at least 2 weeks post-operatively  It is safe to let the arm hang at the side and take a shower and put on a shirt without the sling on  Just make sure that you do not use the operative are to reach out and grab anything as that may damage the repair  When you are done showering and getting dressed please return the operative arm to the sling  Unless noted otherwise in your discharge paperwork, Dr Dami Muniz uses absorbable sutures so they do not need to be removed  Dr Sarah England physician assistant (PA) will see you in the office a few days after the procedure to review the intra-operative findings and to initiate physical therapy if appropriate    A post-operative appointment should have been scheduled for you already, but if for some reason this did not happen, please call the office to make one  Physical therapy is important after nearly all shoulder surgeries and a detailed rehabilitation plan based on the specific intra-operative findings and procedures will be provided to your therapist at the first post-operative office visit  Most patients have post-operative therapy appointments scheduled pre-operatively, but if you do not, that will be handled at the first post-operative office visit  Unless expressly directed otherwise it is safe to remove the sling even the first day after the surgery and let the arm hang by the side  This allows patients to shower and even put a shirt on (bad arm in the sleeve first)  It is also safe to flex and extend their wrist, hand and fingers as much as possible when the block wears off  These simple motions can serve to pump fluid out of the forearm and decrease swelling in the arm

## 2019-09-11 NOTE — ANESTHESIA PROCEDURE NOTES
Peripheral Block    Patient location during procedure: holding area  Start time: 9/11/2019 10:29 AM  Reason for block: at surgeon's request and post-op pain management  Staffing  Anesthesiologist: Griselda Pineda MD  Performed: anesthesiologist   Preanesthetic Checklist  Completed: patient identified, site marked, surgical consent, pre-op evaluation, timeout performed, IV checked, risks and benefits discussed and monitors and equipment checked  Peripheral Block  Patient position: sitting  Prep: ChloraPrep  Patient monitoring: cardiac monitor, continuous pulse ox, frequent blood pressure checks and heart rate  Block type: interscalene  Laterality: right  Injection technique: single-shot  Procedures: ultrasound guided, Ultrasound guidance required for the procedure to increase accuracy and safety of medication placement and decrease risk of complications    Ultrasound permanent image savedbupivacaine (MARCAINE) 0 5 % perineural infiltration, 5 mL  lidocaine (XYLOCAINE) 1 % infiltration, 0 5 mL  midazolam (VERSED) 2 mg/2 mL IV, 2 mg  Needle  Needle type: Stimuplex   Needle gauge: 22 G  Needle length: 5 cm  Needle localization: ultrasound guidance  Assessment  Injection assessment: incremental injection, local visualized surrounding nerve on ultrasound, negative aspiration for heme and no paresthesia on injection  Heart rate change: no  Slow fractionated injection: yes  Post-procedure:  site cleaned  patient tolerated the procedure well with no immediate complications

## 2019-09-11 NOTE — OP NOTE
OPERATIVE REPORT  PATIENT NAME: Say Duran    :  1960  MRN: [id number]  Pt Location: [facility] OR [facility] 06    SURGERY DATE: 2019     SURGEON: January Reilly MD     ASSISTANT: Maria Luisa Woods PA-C     NOTE: Maria Luisa Woods PA-C was present throughout the entire procedure and performed essential assistance with patient prepping, draping, positioning, suture management, wound closure, sterile dressing application and sling application, all under my direct supervision  NOTE: No qualified resident physician was available for assistance    PREOPERATIVE DIAGNOSIS:  Right Shoulder Subscapularis Tear and Long Head Biceps Tendon Subluxation    POSTOPERATIVE DIAGNOSIS: Same with High Grade Partial Long Head Biceps Tendon Tear and Severe, Diffuse Bursal Sided Partial Tearing of the Supraspinatus and Infraspinatus     PROCEDURES: Surgical Arthroscopy Right Shoulder with Rotator Cuff Repair, Long Head Biceps Tenodesis, Extensive Debridement and Subacromial Decompression    ANESTHESIA STAFF: Valorie Seymour MD     ANESTHESIA TYPE: General LMA with ultrasound guided interscalene block (Exparel)    COMPLICATIONS: None    FINDINGS:  Subscapularis Tear, Long Head Biceps Tendon Subluxation, High Grade Partial Long Head Biceps Tendon Tear and Severe, Diffuse Bursal Sided Partial Tearing of the Supraspinatus and Infraspinatus     SPECIMEN(S):  None    ESTIMATED BLOOD LOSS: Minimal    INDICATION:  Briefly, the patient is a 62 y o   male with right shoulder pain  MRI scan confirmed a superior border subscapularis tear and an associated long head biceps tendon subluxation  The patient elected for arthroscopic treatment  Informed consent was obtained after a thorough discussion of the risks and benefits of the procedure, as well as alternatives to the procedure  OPERATIVE TECHNIQUE:  On the day of surgery, I identified the patients right shoulder and marked it with my initials   The patient was taken to the operating room where anesthesia was induced and 2 grams of IV Cefazolin were given  The patient was examined in the supine position and was found to have full range of motion of the right shoulder with no instability   The patient was then positioned in the [address] [place] [address] chair position  All bony prominences were padded  The shoulder was prepped and draped in normal sterile fashion  After a time-out for safety, a standard posterior arthroscopic portal was made  Glenohumeral evaluation revealed intact glenohumeral articular cartilage with no loose bodies  There was a superior border subscapularis tear with an associated long-head biceps tendon subluxation and high-grade partial tearing of the long head of biceps  The undersurface of the supraspinatus and infraspinatus had some fraying but no full-thickness tear  The circumferential glenoid labrum was intact  Given these intra operative findings a superior border subscapularis repair was performed using a 4 5 mm double loaded Mitek Healix Advanced anchor placed in the superior aspect of lesser tuberosity a stitch was placed through the superior border of subscapularis and tied down repairing this anatomically to the lesser tuberosity  The 2nd limb of this anchor was then used to pass a looping whipstitch through around the long head of biceps ensuring a good grasping stitch despite the partial tearing, this was then released off the superior glenoid and tied to lesser tuberosity performing the long-head biceps tenodesis to lesser tuberosity  After the intra-articular work was completed, the scope was then placed in the subacromial space through the same portal where a thorough bursectomy was performed  There was diffuse severe bursal sided partial-thickness tearing of the entire supraspinatus and infraspinatus with no full-thickness tear    There was reciprocal fraying of the CA ligament including excoriation of the undersurface of the acromion consistent with chronic long-term impingement syndrome  Despite the diffuse partial tearing of the supraspinatus infraspinatus no full-thickness tear was seen and no repair was indicated or even a reasonable approach given the diffuse nature of the changes  Instead a thorough debridement of the bursal side was performed to a stable border and then a coracoacromial ligament release with acromioplasty was then performed using electrocautery and a bur  If the patient has persistent symptoms consideration to allograft patch of the bursal side of the supraspinatus and infraspinatus could be considered in the future but given his preoperative symptoms and imaging the subscapularis repair and long-head biceps tenodesis should provide significant improvement in symptoms such that further intervention would not be required  These extent findings (bursal sided changes of the supraspinatus and infraspinatus) were not expected given the preoperative imaging and examination  The area was then irrigated  Scope was withdrawn  Wounds were closed with 4-0 Monocryl and Histoacryl  Sterile dressings and a sling with an abduction pillow was placed  The patient was awoken without complication and returned to the recovery room in good condition  We will see the patient back in the office next week to initiate therapy following accelerated rotator cuff (subscapularis) repair rehabilitation protocol  At the end of procedure, the counts were correct       PATIENT DISPOSITION:  Stable to PACU      SIGNATURE: Federica Carl MD  DATE: September 11, 2019  TIME: 12:18 PM

## 2019-09-11 NOTE — ANESTHESIA POSTPROCEDURE EVALUATION
Post-Op Assessment Note    CV Status:  Stable    Pain management: adequate     Mental Status:  Alert and awake   Hydration Status:  Euvolemic   PONV Controlled:  Controlled   Airway Patency:  Patent   Post Op Vitals Reviewed: Yes      Staff: CRNA           BP (P) 110/58 (09/11/19 1225)    Temp (!) (P) 97 3 °F (36 3 °C) (09/11/19 1225)    Pulse (P) 62 (09/11/19 1225)   Resp (P) 16 (09/11/19 1225)    SpO2 (P) 97 % (09/11/19 1225)

## 2019-09-12 ENCOUNTER — TELEPHONE (OUTPATIENT)
Dept: OBGYN CLINIC | Facility: HOSPITAL | Age: 59
End: 2019-09-12

## 2019-09-12 DIAGNOSIS — F41.9 ANXIETY: Primary | ICD-10-CM

## 2019-09-12 RX ORDER — ALPRAZOLAM 0.5 MG/1
0.5 TABLET ORAL
Qty: 30 TABLET | Refills: 1 | Status: SHIPPED | OUTPATIENT
Start: 2019-09-12 | End: 2019-11-25 | Stop reason: SDUPTHER

## 2019-09-12 NOTE — TELEPHONE ENCOUNTER
Was concerned about "other stuff" Dr Dylan Gordon found intra-operatively  He had subscapularis tear and long head bicep tendon tear - which we knew about and was repaired as discussed  Remainder of rotator cuff (supraspinatus mostly) was beat up/frayed which was debrided as there was not a defined tear  If he continues to have symptoms from this - there are surgical options but we do not think this will be the case  Patient understands and we will review pictures with him when he comes on Monday  He has other questions about returning to work  I told him the intraoperative findings will not likely change his return to work or his ability to return to his regular job

## 2019-09-12 NOTE — TELEPHONE ENCOUNTER
Lakhwinder Escalante  [phone]    Dr Annemarie Garcia    Patient would like call back even though you spoke with his wife PO  He thinks she may have gotten some of the information wrong

## 2019-09-16 ENCOUNTER — OFFICE VISIT (OUTPATIENT)
Dept: OBGYN CLINIC | Facility: HOSPITAL | Age: 59
End: 2019-09-16

## 2019-09-16 ENCOUNTER — DOCUMENTATION (OUTPATIENT)
Dept: OBGYN CLINIC | Facility: HOSPITAL | Age: 59
End: 2019-09-16

## 2019-09-16 VITALS
DIASTOLIC BLOOD PRESSURE: 72 MMHG | BODY MASS INDEX: 33.79 KG/M2 | HEART RATE: 62 BPM | HEIGHT: 70 IN | WEIGHT: 236 LBS | SYSTOLIC BLOOD PRESSURE: 111 MMHG

## 2019-09-16 DIAGNOSIS — S46.811D PARTIAL TEAR OF RIGHT SUBSCAPULARIS TENDON, SUBSEQUENT ENCOUNTER: Primary | ICD-10-CM

## 2019-09-16 PROCEDURE — 99024 POSTOP FOLLOW-UP VISIT: CPT | Performed by: ORTHOPAEDIC SURGERY

## 2019-09-16 NOTE — PATIENT INSTRUCTIONS
Rotator Cuff Repair Rehabilitation :   Accelerated Protocol    (adapted from Janel MONTES et al  Vinnie Easley of the Rotator Cuff: An Evaluation Based Approach  JAAOS 2006; [id number])  Updated [id number]  Bobby Patel MD  [facility]  [address] [place] [address]  [phone]  Phase 1 (Weeks 0-4)   Immediate Postoperative Period   Goals:    Diminish Pain and Inflammation  Maintain and Protect Integrity of the Repair    Gradually Increase Passive ROM (NO Active or Active Assist until Week 6)    Become Independent with Modified ADLs   Precautions:  Maintain Arm in Abduction Sling, Remove Only for Directed Exercises (may remove Abduction Pillow after Day 21 for comfort)    Keep Incisions Clean & Dry (okay to shower in 48 hours, band-aids over incisions)  No Immersion (pool) until Wounds Totally Sealed (usually not prior to day #10)  Passive Shoulder Motion ONLY, No Lifting/Holding Objects, Reaching Behind Back  Okay to Type/Write/Use Mouse at Desktop with Arm in Sling   Day 0-6    Elbow, Wrist, Hand AROM Exercises     Start Cervical AROM and Scapula Isometrics    Cryotherapy/Ice for Pain and Inflammation    Instruct in Hygiene, Posture, and Positioning   May Start Pendulum Exercises     Day 7-28    Continue Above  May add Heat prior to PROM Exercises, Ice after Exercises    May Start Supine, Pain Free, PT assisted PROM  Progress PROM to Goal of full PROM by Week 4  May add Gentle Mobilizations [facility] and Scapulothoracic) to Regain full PROM if Needed  Begin to incorporate AAROM by Week 3 as Tolerated in Prep for AROM Phase  Can Introduce light Cardio (Walking, Stationary Bike)    Aqua Therapy may begin at week 3 (day 21) as long as no wound problems    Phase 2 (Weeks 4-8)   Protection and Active Motion   Goals of Phase:     Allow Healing of Soft Tissues    Decrease Pain and Inflammation  Add ADLs and Regain AROM by End of Phase   Precautions:    NO STRENGTHENING until Phase 3    Repair is Most Prone to Failure during this Phase!     No Lifting Objects > 3 lbs (Coffee Cup OK), no Sudden Motions    Avoid Upper Extremity Bike and Ergometer   Day 29-60    Discontinue Sling  Continue AAROM & Initiate AROM Forward Flexion, ER, IR and Abd, Rotator Cuff Isometrics    Continue Periscapular Exercises, add Stretching if PROM Lacking  No Strengthening until Week 8 (Minimum Time Needed for Cuff Healing Sufficient to withstand Strengthening)     Phase 3 (Weeks 8-12)   Early Strengthening   Goal of Phase:    Gain full AROM, Maintain PROM    Gradual return of Shoulder Strength, Power and Endurance    Gradual return to Functional Activities   Precautions:    No Lifting > 10lbs, Sudden Lifting or Pushing activities, Overhead Lifting    No Upper Extremity Bike or Ergometer   Week 8    Initiate Strengthening Program (10 lb Maximum until Phase 4)      ER/IR with Bands (Standing)      ER in Lateral Decubitius Position      Lateral Raises      Full Can in Scapular Plane      Prone Rowing, Horizontal Abduction, Extension      Elbow Flexion/Extension   Week 12    Initiate light Functional Activities as Permitted  Progress to Fundamental Shoulder Exercises  Phase 4 (Variable but Weeks 12-18)   Aggressive Rehab  Sport Specific or Activity Specific    Goals of Phase:    Maintain Full Pain-free AROM    Advanced Conditioning Exercises for enhanced Functional use    Continue regaining Shoulder Strength, Power and Endurance    Eventual return to full Functional Activities   Precautions:    None   Week 14    Continue ROM and stretching if appropriate    Progress Strengthening, Proprioceptive and Neuromuscular Training    Light Sports if Progressing Well (Chipping/Putting, easy ground strokes etc )   Week 16    Continue Strengthening and Stretching    Initiate Interval Sports Program as Appropriate    Note:   This is a general program which may be modified based on intra-operative findings, additional procedures preformed, repair stability, and patient biological factors  If in doubt, please check with my office for individual patient specifics  The ultimate goal of the surgery and rehabilitation is to get the rotator cuff to heal with the least residual functional deficit due to stiffness  That being said, I would rather have a stiff shoulder with a healed rotator cuff repair, than a loose shoulder with a failed repair!

## 2019-09-16 NOTE — PROGRESS NOTES
Assessment  Diagnoses and all orders for this visit:    Partial tear of right subscapularis tendon, subsequent encounter status post repair with long-head biceps tenodesis        Discussion and Plan:    · 5 days s/p right shoulder arthroscopic rotator cuff repair with biceps tenodesis and SAD performed on 9/11/19  · I did review the intraoperatively we found significant partial thickness fraying of the entire supraspinatus and infraspinatus which was debrided  I suspect this has been there for a long period of time and the subscapularis tear with long-head biceps instability was the acute cause of his symptoms and he should be significantly improved with the procedure that we performed  · Patient will begin formal physical therapy tomorrow (9/17/19) as per accelerated protocol  Patient was provided with a copy of the protocol today in the office  · Instructed to come out of sling on a daily basis as instructed to perform light shoulder pendulum exercises  Do not reach out or overhead  No lifting  Continue use of sling for 4 more weeks  Then may wean as tolerated  · He will follow up in 2 months with Lali Porter PA-C    Subjective:   Patient ID: Shannon Hawley is a 62 y o  male      61 y/o male who presents today for his first post operative visit for his right shoulder  He is now 5 days s/p arthroscopic rotator cuff repair, biceps tenodesis and SAD performed on 9/11/19  Patient states that he is doing well post operatively  He has been compliant with his sling and post operative instructions  He states that he will begin formal physical therapy tomorrow as per protocol  Denies numbness and tingling, fevers or chills             The following portions of the patient's history were reviewed and updated as appropriate: allergies, current medications, past family history, past medical history, past social history, past surgical history and problem list     Review of Systems   Constitutional: Negative for chills, fatigue, fever and unexpected weight change  HENT: Negative for hearing loss, nosebleeds and sore throat  Eyes: Negative for pain, redness and visual disturbance  Respiratory: Negative for cough, shortness of breath and wheezing  Cardiovascular: Negative for chest pain, palpitations and leg swelling  Gastrointestinal: Negative for abdominal pain, nausea and vomiting  Endocrine: Negative for polydipsia and polyuria  Genitourinary: Negative for frequency and urgency  Skin: Negative for color change, rash and wound  Neurological: Negative for dizziness, weakness, numbness and headaches  Psychiatric/Behavioral: Negative for behavioral problems, self-injury and suicidal ideas  Objective:  Right Shoulder Exam     Tenderness   The patient is experiencing no tenderness  Other   Erythema: absent  Scars: present  Sensation: normal  Pulse: present    Comments:  ROM and strength testing deferred today due to post operative period  Incision is C/D/I, no signs of infection  Physical Exam   Constitutional: He is oriented to person, place, and time  He appears well-developed and well-nourished  No distress  Eyes: Pupils are equal, round, and reactive to light  Conjunctivae are normal    Neck: Normal range of motion  Neck supple  Cardiovascular: Normal rate, regular rhythm and intact distal pulses  Pulmonary/Chest: Effort normal and breath sounds normal    Abdominal: Soft  Bowel sounds are normal    Neurological: He is alert and oriented to person, place, and time  He has normal reflexes  Skin: Skin is warm and dry  No rash noted  No erythema  Psychiatric: He has a normal mood and affect   His behavior is normal      Scribe Attestation    I,:   Milkachase Blank am acting as a scribe while in the presence of the attending physician :        I,:   Janice Uribe MD personally performed the services described in this documentation    as scribed in my presence :

## 2019-09-17 ENCOUNTER — OFFICE VISIT (OUTPATIENT)
Dept: PHYSICAL THERAPY | Facility: CLINIC | Age: 59
End: 2019-09-17
Payer: COMMERCIAL

## 2019-09-17 DIAGNOSIS — S46.811D PARTIAL TEAR OF RIGHT SUBSCAPULARIS TENDON, SUBSEQUENT ENCOUNTER: Primary | ICD-10-CM

## 2019-09-17 PROCEDURE — 97140 MANUAL THERAPY 1/> REGIONS: CPT | Performed by: PHYSICAL THERAPIST

## 2019-09-17 PROCEDURE — G8985 CARRY GOAL STATUS: HCPCS | Performed by: PHYSICAL THERAPIST

## 2019-09-17 PROCEDURE — 97164 PT RE-EVAL EST PLAN CARE: CPT | Performed by: PHYSICAL THERAPIST

## 2019-09-17 PROCEDURE — G8984 CARRY CURRENT STATUS: HCPCS | Performed by: PHYSICAL THERAPIST

## 2019-09-17 NOTE — PROGRESS NOTES
PT Evaluation     Today's date: 2019  Patient name: Radha Carvajal  : 1960  MRN: [id number]  Referring provider: Rafa Burgess MD  Dx:   Encounter Diagnosis     ICD-10-CM    1  Partial tear of right subscapularis tendon, subsequent encounter S43 81XD        Start Time: 910  Stop Time: 945  Total time in clinic (min): 35 minutes    Assessment/Plan       Assessment  Assessment details: Radha Carvajal is a pleasant 62 y o  male who presents for a pre-operative visit for a R rotator cuff repair on   The patient's greatest concerns are getting back to work as quickly as possible  Pt was instructed on post operative care and donning/doffing the sling  Incision - clean, dry and intact  No significant erythema or swelling  Neurovascular exam within normal limits  He is compliant with sling precautions  No further referral appears necessary at this time based upon examination results  Primary Limitations:   1  Limited strength   2  Limited ROM    Impairments: abnormal or restricted ROM, abnormal movement, activity intolerance, impaired physical strength, lacks appropriate home exercise program, pain with function, poor posture  and poor body mechanics  Functional limitations: unable to work without pain, unable to sleep without pain   Symptom irritability: lowBarriers to therapy: Co-insurance   Understanding of Dx/Px/POC: good   Prognosis: fair  Prognosis details: Positive prognostic indicators include positive attitude toward recovery  Negative prognostic indicators include co-insurance    Goals  ST  Independent with HEP in 2 weeks  2  Pt will have verbal report of improvement in symptoms by >/=25% in 2 weeks   3  Pt will be able to don/doff sling independently in 2 weeks   LT  Pt will be able to sleep through the night by time of d/c   2  Pt will be able to return to work by time of d/c   3  Pt will be able to improve FOTO score by >/= 6 points in 6 weeks   4   Pt will be able to improve FOTO score >/=69 by visit #22        Plan  Plan details: Prognosis above is given PT services 1x/week and home program adherence  Patient would benefit from: skilled physical therapy  Planned modality interventions: thermotherapy: hydrocollator packs  Planned therapy interventions: activity modification, joint mobilization, manual therapy, motor coordination training, neuromuscular re-education, patient education, self care, therapeutic activities, therapeutic exercise, graded activity, home exercise program, behavior modification, strengthening, stretching, functional ROM exercises and postural training  Frequency: 1x week  Treatment plan discussed with: patient        Subjective Evaluation    History of Present Illness  Mechanism of injury: Pt frozen  who presents for a pre-operative  He has to be able to lift up to 25lbs to return to work  Pt has had shoulder pain for about 3 years  He has recently undergone a course of physical therapy and was referred back to his physician  He is scheduled for surgery on   Pain  Current pain ratin  At best pain ratin  At worst pain ratin  Quality: sharp    Patient Goals  Patient goals for therapy: return to work and decreased pain  Patient goal: be able to carry for awhile without pain,         Objective     Postural Observations  Seated posture: poor  Standing posture: poor        Active Range of Motion   Left Shoulder   Flexion: 170 degrees   Extension: 150 degrees   External rotation BTH: Active external rotation behind the head: occiput     Internal rotation BTB: T12     Right Shoulder   Not tested secondary to post operative     Strength/Myotome Testing     Left Shoulder     Planes of Motion   Flexion: 5   Extension: 5   Abduction: 4-   External rotation at 0°: 5   Internal rotation at 0°: 5     Right Shoulder   Unable to test secondary to post operative        Flowsheet Rows      Most Recent Value   PT/OT G-Codes   Current Score  36   Projected Score  69                 Precautions: anxiety, HTN, PROTOCOL ACCELERATED       Manual  9/9 9/17           PROM to tolerance   KB                                                                   Exercise Diary  9/9 9/17           Wrist ROM             scap retraction             Pendulum   x2 min                                                                                                                                                                                                                                             Modalities

## 2019-09-24 ENCOUNTER — OFFICE VISIT (OUTPATIENT)
Dept: PHYSICAL THERAPY | Facility: CLINIC | Age: 59
End: 2019-09-24
Payer: COMMERCIAL

## 2019-09-24 DIAGNOSIS — S46.811D PARTIAL TEAR OF RIGHT SUBSCAPULARIS TENDON, SUBSEQUENT ENCOUNTER: Primary | ICD-10-CM

## 2019-09-24 PROCEDURE — 97140 MANUAL THERAPY 1/> REGIONS: CPT | Performed by: PHYSICAL THERAPIST

## 2019-09-24 NOTE — PROGRESS NOTES
Daily Note     Today's date: 2019  Patient name: Yrn Panda  : 1960  MRN: [id number]  Referring provider: Teena Duff MD  Dx:   Encounter Diagnosis     ICD-10-CM    1  Partial tear of right subscapularis tendon, subsequent encounter S43 81XD                   Subjective: Pt reports that he is compliant with his HEP and had some pain after all the nerve block wore off  Objective: See treatment diary below      Assessment: Pt's ROM is improving  Did have some muscle spasms with PROM about 90 deg  Elbow extension is additionally restricted at this time  Pt reports compliance with his HEP, and instructed on passive stretching for home  Pt verbalized and demonstrated understanding  Plan: Continue per plan of care  Progress treatment as tolerated            Precautions: anxiety, HTN, PROTOCOL ACCELERATED       Manual            PROM to tolerance   KB KB                                                                  Exercise Diary            Wrist ROM             scap retraction             Pendulum   x2 min            Self stretch on table    x2 min                                                                                                                                                                                                                               Modalities

## 2019-10-01 ENCOUNTER — OFFICE VISIT (OUTPATIENT)
Dept: PHYSICAL THERAPY | Facility: CLINIC | Age: 59
End: 2019-10-01
Payer: COMMERCIAL

## 2019-10-01 DIAGNOSIS — S46.811D PARTIAL TEAR OF RIGHT SUBSCAPULARIS TENDON, SUBSEQUENT ENCOUNTER: Primary | ICD-10-CM

## 2019-10-01 PROCEDURE — 97140 MANUAL THERAPY 1/> REGIONS: CPT | Performed by: PHYSICAL THERAPIST

## 2019-10-01 NOTE — PROGRESS NOTES
Daily Note     Today's date: 10/1/2019  Patient name: Abby Tena  : 1960  MRN: [id number]  Referring provider: Oliver Cuellar MD  Dx:   Encounter Diagnosis     ICD-10-CM    1  Partial tear of right subscapularis tendon, subsequent encounter S43 81XD                   Subjective: Pt reports that today he is in a little more pain today which he think is associated with the weather  Objective: See treatment diary below      Assessment: Pt's PROM is near full with some end range pain and restrictions in IR and flexion  Pt notes compliance with his HEP  Plan: Continue per plan of care  Progress treatment as tolerated            Precautions: anxiety, HTN, PROTOCOL ACCELERATED       Manual  9/9 9/17 9/24 10/1         PROM to tolerance   KB KB KB                                                                 Exercise Diary  9/9 9/17 9/24 10/1         Wrist ROM    HEP         scap retraction    HEP         Pendulum   x2 min   HEP         Self stretch on table    x2 min  HEP                                                                                                                                                                                                                             Modalities

## 2019-10-08 ENCOUNTER — OFFICE VISIT (OUTPATIENT)
Dept: PHYSICAL THERAPY | Facility: CLINIC | Age: 59
End: 2019-10-08
Payer: COMMERCIAL

## 2019-10-08 DIAGNOSIS — S46.811D PARTIAL TEAR OF RIGHT SUBSCAPULARIS TENDON, SUBSEQUENT ENCOUNTER: Primary | ICD-10-CM

## 2019-10-08 PROCEDURE — 97140 MANUAL THERAPY 1/> REGIONS: CPT | Performed by: PHYSICAL THERAPIST

## 2019-10-08 PROCEDURE — 97110 THERAPEUTIC EXERCISES: CPT | Performed by: PHYSICAL THERAPIST

## 2019-10-08 NOTE — PROGRESS NOTES
Daily Note     Today's date: 10/8/2019  Patient name: Alan Ricketts  : 1960  MRN: [id number]  Referring provider: Benjamin White MD  Dx:   Encounter Diagnosis     ICD-10-CM    1  Partial tear of right subscapularis tendon, subsequent encounter S43 81XD                   Subjective: Pt reports thing are going well  He notes that he will be flying to [place] tomorrow and will be back Friday  Objective: See treatment diary below      Assessment: Pt's ROM is improving with end range limitations, instructed on AAROM with light isometrics and was not painful with exercise progressions  Plan: Continue per plan of care  Progress treatment as tolerated            Precautions: anxiety, HTN, PROTOCOL ACCELERATED       Manual  9/9 9/17 9/24 10/1 10/8        PROM to tolerance   KB KB KB KB                                                                Exercise Diary  9/9 9/17 9/24 10/1 10/8        Wrist ROM    HEP         scap retraction    HEP         Pendulum   x2 min   HEP         Self stretch on table    x2 min  HEP         AAROM flexion      10s x10        AAROM ER     10s x10        Standing AAROM abd     10s x10        Flex isometric      10s x10        ER isometric      10s x10                                                                                                                                                           Modalities

## 2019-10-15 ENCOUNTER — OFFICE VISIT (OUTPATIENT)
Dept: PHYSICAL THERAPY | Facility: CLINIC | Age: 59
End: 2019-10-15
Payer: COMMERCIAL

## 2019-10-15 DIAGNOSIS — S46.811D PARTIAL TEAR OF RIGHT SUBSCAPULARIS TENDON, SUBSEQUENT ENCOUNTER: Primary | ICD-10-CM

## 2019-10-15 PROCEDURE — 97110 THERAPEUTIC EXERCISES: CPT | Performed by: PHYSICAL THERAPIST

## 2019-10-15 PROCEDURE — 97140 MANUAL THERAPY 1/> REGIONS: CPT | Performed by: PHYSICAL THERAPIST

## 2019-10-15 NOTE — PROGRESS NOTES
Daily Note     Today's date: 10/15/2019  Patient name: Geni Grace  : 1960  MRN: [id number]  Referring provider: Philippe Osuna MD  Dx:   Encounter Diagnosis     ICD-10-CM    1  Partial tear of right subscapularis tendon, subsequent encounter S43 81XD                   Subjective: Pt reports that he wasn't able to do this exercises as much this weekend, weaning out of the sling is going well and is now out of the sling full time  Objective: See treatment diary below      Assessment: Pt's ROM is most limited in abduction and instructed patient to increase the frequency of stretching to improve mobility  Continued to have some end range restrictions with flexion and IR  Plan: Continue per plan of care  Progress treatment as tolerated            Precautions: anxiety, HTN, PROTOCOL ACCELERATED       Manual  9/9 9/17 9/24 10/1 10/8 10/15       PROM to tolerance   KB KB KB KB KB                                                               Exercise Diary  9/9 9/17 9/24 10/1 10/8 10/15       Wrist ROM    HEP         scap retraction    HEP         Pendulum   x2 min   HEP         Self stretch on table    x2 min  HEP         AAROM flexion      10s x10 10s x10       AAROM ER     10s x10 10s x10       Standing AAROM abd     10s x10 10s x10       Flex isometric      10s x10 10s x10       ER isometric      10s x10 10s x10       ABD isometric      10s x10                                                                                                                                             Modalities

## 2019-10-22 ENCOUNTER — OFFICE VISIT (OUTPATIENT)
Dept: PHYSICAL THERAPY | Facility: CLINIC | Age: 59
End: 2019-10-22
Payer: COMMERCIAL

## 2019-10-22 DIAGNOSIS — S46.811D PARTIAL TEAR OF RIGHT SUBSCAPULARIS TENDON, SUBSEQUENT ENCOUNTER: Primary | ICD-10-CM

## 2019-10-22 PROCEDURE — 97110 THERAPEUTIC EXERCISES: CPT | Performed by: PHYSICAL THERAPIST

## 2019-10-22 PROCEDURE — 97140 MANUAL THERAPY 1/> REGIONS: CPT | Performed by: PHYSICAL THERAPIST

## 2019-10-25 ENCOUNTER — OFFICE VISIT (OUTPATIENT)
Dept: PHYSICAL THERAPY | Facility: CLINIC | Age: 59
End: 2019-10-25
Payer: COMMERCIAL

## 2019-10-25 DIAGNOSIS — S46.811D PARTIAL TEAR OF RIGHT SUBSCAPULARIS TENDON, SUBSEQUENT ENCOUNTER: Primary | ICD-10-CM

## 2019-10-25 PROCEDURE — 97140 MANUAL THERAPY 1/> REGIONS: CPT | Performed by: PHYSICAL THERAPIST

## 2019-10-25 PROCEDURE — 97110 THERAPEUTIC EXERCISES: CPT | Performed by: PHYSICAL THERAPIST

## 2019-10-25 PROCEDURE — 97112 NEUROMUSCULAR REEDUCATION: CPT | Performed by: PHYSICAL THERAPIST

## 2019-10-25 NOTE — PROGRESS NOTES
Daily Note     Today's date: 10/25/2019  Patient name: Perla Goins  : 1960  MRN: [id number]  Referring provider: iNthin Varela MD  Dx:   Encounter Diagnosis     ICD-10-CM    1  Partial tear of right subscapularis tendon, subsequent encounter S43 81XD                   Subjective: Pt reports that he has some bicep pain this morning  Objective: See treatment diary below      Assessment: Pt continued to be most limited in abduction passively and actively  His range improved with manual work and muscular re-education to near full by end of session  He was able to increase reps and complete all active ROM with no c/o of pain  Plan: Continue per plan of care  Progress treatment as tolerated            Precautions: anxiety, HTN, PROTOCOL ACCELERATED       Manual  9/9 9/17 9/24 10/1 10/8 10/15 10/22 10/25     PROM to tolerance   KB KB KB KB KB KB KB     GH mob for motion gr 3-4       KB KB     Abduction muscular re-ed with controlled lowering to 90       KB KB                                   Exercise Diary  9/9 9/17 9/24 10/1 10/8 10/15 10/22 10/25     Pulleys       x5' x5'     Wrist ROM    HEP         scap retraction    HEP         Pendulum   x2 min   HEP         Self stretch on table    x2 min  HEP         AAROM flexion      10s x10 10s x10       AAROM ER     10s x10 10s x10       Standing AAROM abd     10s x10 10s x10       Flex isometric      10s x10 10s x10 10s x10 10s x10     ER isometric      10s x10 10s x10 10s x10 10s x10     ADD isometric       10s   x10 10s x10     ABD isometric      10s x10 10s x10 10s x10     Wall slide with lift off        2x10 3x10     Standing cane flexion eccentric        2x10 Active 3x10     Prone Y, T, I        2x10 3x10     Elbow active ROM        3x10                                                                                       Modalities

## 2019-10-29 ENCOUNTER — OFFICE VISIT (OUTPATIENT)
Dept: PHYSICAL THERAPY | Facility: CLINIC | Age: 59
End: 2019-10-29
Payer: COMMERCIAL

## 2019-10-29 DIAGNOSIS — S46.811D PARTIAL TEAR OF RIGHT SUBSCAPULARIS TENDON, SUBSEQUENT ENCOUNTER: Primary | ICD-10-CM

## 2019-10-29 PROCEDURE — 97112 NEUROMUSCULAR REEDUCATION: CPT | Performed by: PHYSICAL THERAPIST

## 2019-10-29 PROCEDURE — 97140 MANUAL THERAPY 1/> REGIONS: CPT | Performed by: PHYSICAL THERAPIST

## 2019-10-29 PROCEDURE — 97110 THERAPEUTIC EXERCISES: CPT | Performed by: PHYSICAL THERAPIST

## 2019-10-29 NOTE — PROGRESS NOTES
Daily Note     Today's date: 10/29/2019  Patient name: Sergio Webster  : 1960  MRN: [id number]  Referring provider: Valeria Corral MD  Dx:   Encounter Diagnosis     ICD-10-CM    1  Partial tear of right subscapularis tendon, subsequent encounter S43 81XD                   Subjective: Pt reports that he is doing well  Objective: See treatment diary below      Assessment: Pt had difficulty with retraction and depression with prone work and required manual assistance to facilitate mid and low trap firing  ROM is near end range restrictions in IR and abd  Plan: Continue per plan of care  Progress treatment as tolerated            Precautions: anxiety, HTN, PROTOCOL ACCELERATED       Manual  9/9 9/17 9/24 10/1 10/8 10/15 10/22 10/25 10/29    PROM to tolerance   KB KB KB KB KB KB KB KB    GH mob for motion gr 3-4       KB KB KB    Abduction muscular re-ed with controlled lowering to 90       KB KB KB    rhythmic stab with overhead activity         KB                     Exercise Diary  9/9 9/17 9/24 10/1 10/8 10/15 10/22 10/25 10/29    Pulleys       x5' x5' x5'    Wrist ROM    HEP         scap retraction    HEP         Pendulum   x2 min   HEP         Self stretch on table    x2 min  HEP         AAROM flexion      10s x10 10s x10       AAROM ER     10s x10 10s x10       Standing AAROM abd     10s x10 10s x10       Flex isometric      10s x10 10s x10 10s x10 10s x10 10s x10    ER isometric      10s x10 10s x10 10s x10 10s x10 10s x10    ADD isometric       10s   x10 10s x10 10s x10    ABD isometric      10s x10 10s x10 10s x10 10s x10    Wall slide with lift off        2x10 3x10 3x10 c 10 lifts in between    Standing cane flexion eccentric        2x10 Active 3x10 Active 3x10    Prone Y, T, I        2x10 3x10 3x10 #2 I & T, no weight Y    Elbow active ROM        3x10 3x10 #2    IR behind the back         3x30s                                                                         Modalities

## 2019-11-01 ENCOUNTER — APPOINTMENT (OUTPATIENT)
Dept: PHYSICAL THERAPY | Facility: CLINIC | Age: 59
End: 2019-11-01
Payer: COMMERCIAL

## 2019-11-05 ENCOUNTER — APPOINTMENT (OUTPATIENT)
Dept: PHYSICAL THERAPY | Facility: CLINIC | Age: 59
End: 2019-11-05
Payer: COMMERCIAL

## 2019-11-05 ENCOUNTER — OFFICE VISIT (OUTPATIENT)
Dept: GASTROENTEROLOGY | Facility: CLINIC | Age: 59
End: 2019-11-05

## 2019-11-05 VITALS
HEIGHT: 70 IN | DIASTOLIC BLOOD PRESSURE: 98 MMHG | HEART RATE: 91 BPM | SYSTOLIC BLOOD PRESSURE: 142 MMHG | WEIGHT: 240.2 LBS | BODY MASS INDEX: 34.39 KG/M2

## 2019-11-05 DIAGNOSIS — R19.7 DIARRHEA, UNSPECIFIED TYPE: Primary | ICD-10-CM

## 2019-11-05 PROCEDURE — 99213 OFFICE O/P EST LOW 20 MIN: CPT | Performed by: PHYSICIAN ASSISTANT

## 2019-11-05 NOTE — PROGRESS NOTES
[facility] - Outpatient Follow-up Note  Chava Siddiqui 62 y o  male MRN: [id number]  Encounter: [id number]          ASSESSMENT AND PLAN:      1  Diarrhea, unspecified type  Will do stool cultures to rule out C diff as well as any enteric pathogens  Will start align probiotic  No plans for any repeat endoscopic evaluation  Patient will call back within the next several days if he is not any better  Recommended Pedialyte and plenty of fluids  Brat diet to advance as tolerated  ______________________________________________________________________    SUBJECTIVE:   60-year-old male who is here with acute onset diarrhea  Patient reports that last Thursday he had acute onset of profuse diarrhea and fevers  He reports that the symptoms lasted until Saturday and he actually started feeling better he reports that Saturday night he actually ate a pizza with no problem and no diarrhea  He reports on Sunday all of his symptoms have returned and they have been present ever since  Patient is reporting up to 10 bowel movements a day  Patient denies any melena or rectal bleeding  Patient did have a colonoscopy in 2017 was a normal examination  Patient denies all family history of inflammatory bowel disease  Patient is reporting abdominal fullness and heaviness he reports that his appetite has decreased since Thursday  He denies any recent antibiotic use  He does report that he was hospitalized for shoulder surgery for several hours [facility] but no overnight stay  Who denies any recent travel or sick contacts  He is concerned about C diff  REVIEW OF SYSTEMS IS OTHERWISE NEGATIVE        Historical Information   Past Medical History:   Diagnosis Date    Anxiety     CPAP (continuous positive airway pressure) dependence     Diverticulitis     GERD (gastroesophageal reflux disease)     Hypertension     Sleep apnea      Past Surgical History:   Procedure Laterality Date    CHOLECYSTECTOMY      COLECTOMY      partial    COLONOSCOPY      JOINT REPLACEMENT Right     THR    KNEE ARTHROSCOPY      B/L    [place] ESOPHAGOGASTRODUODENOSCOPY TRANSORAL DIAGNOSTIC N/A 3/1/2017    Procedure: EGD AND COLONOSCOPY;  Surgeon: Tiny Genao MD;  [facility]; Service: Gastroenterology    [place] ESOPHAGOGASTRODUODENOSCOPY TRANSORAL DIAGNOSTIC N/A 2018    Procedure: ESOPHAGOGASTRODUODENOSCOPY (EGD); Surgeon: Tiny Genao MD;  [facility]; Service: Gastroenterology    [place] SHLDR ARTHROSCOP,SURG,W/ROTAT CUFF REPR Right 2019    Procedure: SHOULDER ARTHROSCOPIC ROTATOR CUFF REPAIR, BICEP TENODESIS, AND SAD;  Surgeon: Jaime Barnes MD;  [facility];  Service: Orthopedics    REPLACEMENT TOTAL HIP W/  RESURFACING IMPLANTS       Social History   Social History     Substance and Sexual Activity   Alcohol Use Not Currently    Drinks per session: 1 or 2    Comment: rarely     Social History     Substance and Sexual Activity   Drug Use No     Social History     Tobacco Use   Smoking Status Former Smoker    Last attempt to quit:     Years since quittin 8   Smokeless Tobacco Never Used     History reviewed  No pertinent family history  Meds/Allergies       Current Outpatient Medications:     ALPRAZolam (XANAX) 0 5 mg tablet    irbesartan-hydrochlorothiazide (AVALIDE) 150-12 5 MG per tablet    lansoprazole (PREVACID) 30 mg capsule    Multiple Vitamins-Minerals (MULTIVITAMIN ADULT PO)    oxyCODONE (ROXICODONE) 5 mg immediate release tablet    tobramycin-dexamethasone (TOBRADEX) ophthalmic suspension    No Known Allergies        Objective     Blood pressure 142/98, pulse 91, height 5' 10" (1 778 m), weight 109 kg (240 lb 3 2 oz)  Body mass index is 34 47 kg/m²        PHYSICAL EXAM:      General Appearance:   Alert, cooperative, no distress   HEENT:   Normocephalic, atraumatic, anicteric      Neck:  Supple, symmetrical, trachea midline Lungs:   Clear to auscultation bilaterally; no rales, rhonchi or wheezing; respirations unlabored    Heart[de-identified]   Regular rate and rhythm; no murmur, rub, or gallop  Abdomen:   Soft, non-tender, non-distended; normal bowel sounds; no masses, no organomegaly    Genitalia:   Deferred    Rectal:   Deferred    Extremities:  No cyanosis, clubbing or edema    Pulses:  2+ and symmetric    Skin:  No jaundice, rashes, or lesions    Lymph nodes:  No palpable cervical lymphadenopathy        Lab Results:   No visits with results within 1 Day(s) from this visit  Latest known visit with results is:   Appointment on 10/26/2018   Component Date Value    CRP 10/26/2018 <3 0     Sed Rate 10/26/2018 2          Radiology Results:   No results found

## 2019-11-05 NOTE — LETTER
November 5, 2019     Sriram Medina MD  [address] [place] [id number]    Patient: Orlin Barton   YOB: 1960   Date of Visit: 11/5/2019       Dear Dr Chas Allen:    Thank you for referring Belinda Conn to me for evaluation  Below are my notes for this consultation  If you have questions, please do not hesitate to call me  I look forward to following your patient along with you  Sincerely,        Lissa De Dios PA-C        CC: No Recipients  Lissa De Dios PA-C  11/5/2019 11:07 AM  Sign at close encounter  [facility] - Outpatient Follow-up Note  Orlin Barton 62 y o  male MRN: [id number]  Encounter: [id number]          ASSESSMENT AND PLAN:      1  Diarrhea, unspecified type  Will do stool cultures to rule out C diff as well as any enteric pathogens  Will start align probiotic  No plans for any repeat endoscopic evaluation  Patient will call back within the next several days if he is not any better  Recommended Pedialyte and plenty of fluids  Brat diet to advance as tolerated  ______________________________________________________________________    SUBJECTIVE:   59-year-old male who is here with acute onset diarrhea  Patient reports that last Thursday he had acute onset of profuse diarrhea and fevers  He reports that the symptoms lasted until Saturday and he actually started feeling better he reports that Saturday night he actually ate a pizza with no problem and no diarrhea  He reports on Sunday all of his symptoms have returned and they have been present ever since  Patient is reporting up to 10 bowel movements a day  Patient denies any melena or rectal bleeding  Patient did have a colonoscopy in 2017 was a normal examination  Patient denies all family history of inflammatory bowel disease  Patient is reporting abdominal fullness and heaviness he reports that his appetite has decreased since Thursday    He denies any recent antibiotic use  He does report that he was hospitalized for shoulder surgery for several hours [facility] but no overnight stay  Who denies any recent travel or sick contacts  He is concerned about C diff  REVIEW OF SYSTEMS IS OTHERWISE NEGATIVE  Historical Information   Past Medical History:   Diagnosis Date    Anxiety     CPAP (continuous positive airway pressure) dependence     Diverticulitis     GERD (gastroesophageal reflux disease)     Hypertension     Sleep apnea      Past Surgical History:   Procedure Laterality Date    CHOLECYSTECTOMY      COLECTOMY      partial    COLONOSCOPY      JOINT REPLACEMENT Right     THR    KNEE ARTHROSCOPY      B/L    [place] ESOPHAGOGASTRODUODENOSCOPY TRANSORAL DIAGNOSTIC N/A 3/1/2017    Procedure: EGD AND COLONOSCOPY;  Surgeon: Maryanne Harkins MD;  [facility]; Service: Gastroenterology    [place] ESOPHAGOGASTRODUODENOSCOPY TRANSORAL DIAGNOSTIC N/A 2018    Procedure: ESOPHAGOGASTRODUODENOSCOPY (EGD); Surgeon: Maryanne Harkins MD;  [facility]; Service: Gastroenterology    [place] SHLDR ARTHROSCOP,SURG,W/ROTAT CUFF REPR Right 2019    Procedure: SHOULDER ARTHROSCOPIC ROTATOR CUFF REPAIR, BICEP TENODESIS, AND SAD;  Surgeon: Krista Dutton MD;  [facility];  Service: Orthopedics    REPLACEMENT TOTAL HIP W/  RESURFACING IMPLANTS       Social History   Social History     Substance and Sexual Activity   Alcohol Use Not Currently    Drinks per session: 1 or 2    Comment: rarely     Social History     Substance and Sexual Activity   Drug Use No     Social History     Tobacco Use   Smoking Status Former Smoker    Last attempt to quit: 2003    Years since quittin 8   Smokeless Tobacco Never Used     History reviewed  No pertinent family history      Meds/Allergies       Current Outpatient Medications:     ALPRAZolam (XANAX) 0 5 mg tablet    irbesartan-hydrochlorothiazide (AVALIDE) 150-12 5 MG per tablet   lansoprazole (PREVACID) 30 mg capsule    Multiple Vitamins-Minerals (MULTIVITAMIN ADULT PO)    oxyCODONE (ROXICODONE) 5 mg immediate release tablet    tobramycin-dexamethasone (TOBRADEX) ophthalmic suspension    No Known Allergies        Objective     Blood pressure 142/98, pulse 91, height 5' 10" (1 778 m), weight 109 kg (240 lb 3 2 oz)  Body mass index is 34 47 kg/m²  PHYSICAL EXAM:      General Appearance:   Alert, cooperative, no distress   HEENT:   Normocephalic, atraumatic, anicteric      Neck:  Supple, symmetrical, trachea midline   Lungs:   Clear to auscultation bilaterally; no rales, rhonchi or wheezing; respirations unlabored    Heart[de-identified]   Regular rate and rhythm; no murmur, rub, or gallop  Abdomen:   Soft, non-tender, non-distended; normal bowel sounds; no masses, no organomegaly    Genitalia:   Deferred    Rectal:   Deferred    Extremities:  No cyanosis, clubbing or edema    Pulses:  2+ and symmetric    Skin:  No jaundice, rashes, or lesions    Lymph nodes:  No palpable cervical lymphadenopathy        Lab Results:   No visits with results within 1 Day(s) from this visit  Latest known visit with results is:   Appointment on 10/26/2018   Component Date Value    CRP 10/26/2018 <3 0     Sed Rate 10/26/2018 2          Radiology Results:   No results found

## 2019-11-05 NOTE — PATIENT INSTRUCTIONS
Abdominal Pain   WHAT YOU NEED TO KNOW:   Abdominal pain can be dull, achy, or sharp  You may have pain in one area of your abdomen, or in your entire abdomen  Your pain may be caused by a condition such as constipation, food sensitivity or poisoning, infection, or a blockage  Abdominal pain can also be from a hernia, appendicitis, or an ulcer  Liver, gallbladder, or kidney conditions can also cause abdominal pain  The cause of your abdominal pain may be unknown  DISCHARGE INSTRUCTIONS:   Return to the emergency department if:   · You have new chest pain or shortness of breath  · You have pulsing pain in your upper abdomen or lower back that suddenly becomes constant  · Your pain is in the right lower abdominal area and worsens with movement  · You have a fever over 100 4°F (38°C) or shaking chills  · You are vomiting and cannot keep food or liquids down  · Your pain does not improve or gets worse over the next 8 to 12 hours  · You see blood in your vomit or bowel movements, or they look black and tarry  · Your skin or the whites of your eyes turn yellow  · You are a woman and have a large amount of vaginal bleeding that is not your monthly period  Contact your healthcare provider if:   · You have pain in your lower back  · You are a man and have pain in your testicles  · You have pain when you urinate  · You have questions or concerns about your condition or care  Follow up with your healthcare provider within 24 hours or as directed:  Write down your questions so you remember to ask them during your visits  Medicines:   · Medicines  may be given to calm your stomach and prevent vomiting or to decrease pain  Ask how to take pain medicine safely  · Take your medicine as directed  Contact your healthcare provider if you think your medicine is not helping or if you have side effects  Tell him of her if you are allergic to any medicine   Keep a list of the medicines, vitamins, and herbs you take  Include the amounts, and when and why you take them  Bring the list or the pill bottles to follow-up visits  Carry your medicine list with you in case of an emergency  © 2017 [employer] Information is for End User's use only and may not be sold, redistributed or otherwise used for commercial purposes  All illustrations and images included in CareNotes® are the copyrighted property of [employer]  or [employer]  The above information is an  only  It is not intended as medical advice for individual conditions or treatments  Talk to your doctor, nurse or pharmacist before following any medical regimen to see if it is safe and effective for you

## 2019-11-06 ENCOUNTER — APPOINTMENT (OUTPATIENT)
Dept: PHYSICAL THERAPY | Facility: CLINIC | Age: 59
End: 2019-11-06
Payer: COMMERCIAL

## 2019-11-06 ENCOUNTER — APPOINTMENT (OUTPATIENT)
Dept: LAB | Facility: HOSPITAL | Age: 59
End: 2019-11-06
Payer: COMMERCIAL

## 2019-11-06 DIAGNOSIS — R19.7 DIARRHEA, UNSPECIFIED TYPE: ICD-10-CM

## 2019-11-06 PROCEDURE — 87505 NFCT AGENT DETECTION GI: CPT

## 2019-11-06 PROCEDURE — 87205 SMEAR GRAM STAIN: CPT

## 2019-11-07 LAB
C DIFF TOX GENS STL QL NAA+PROBE: NORMAL
CAMPYLOBACTER DNA SPEC NAA+PROBE: NORMAL
SALMONELLA DNA SPEC QL NAA+PROBE: NORMAL
SHIGA TOXIN STX GENE SPEC NAA+PROBE: NORMAL
SHIGELLA DNA SPEC QL NAA+PROBE: NORMAL
WBC STL QL MICRO: NORMAL

## 2019-11-08 ENCOUNTER — TELEPHONE (OUTPATIENT)
Dept: GASTROENTEROLOGY | Facility: CLINIC | Age: 59
End: 2019-11-08

## 2019-11-08 ENCOUNTER — OFFICE VISIT (OUTPATIENT)
Dept: PHYSICAL THERAPY | Facility: CLINIC | Age: 59
End: 2019-11-08
Payer: COMMERCIAL

## 2019-11-08 DIAGNOSIS — S46.811D PARTIAL TEAR OF RIGHT SUBSCAPULARIS TENDON, SUBSEQUENT ENCOUNTER: Primary | ICD-10-CM

## 2019-11-08 PROCEDURE — 97112 NEUROMUSCULAR REEDUCATION: CPT | Performed by: PHYSICAL THERAPIST

## 2019-11-08 PROCEDURE — 97110 THERAPEUTIC EXERCISES: CPT | Performed by: PHYSICAL THERAPIST

## 2019-11-08 PROCEDURE — 97140 MANUAL THERAPY 1/> REGIONS: CPT | Performed by: PHYSICAL THERAPIST

## 2019-11-08 NOTE — TELEPHONE ENCOUNTER
Pt called again, wanted to know if he will need any medication based on test results, I let him know the computer system was down for a couple of hours and that someone will call him as soon as they can

## 2019-11-08 NOTE — PROGRESS NOTES
Daily Note     Today's date: 2019  Patient name: Orlin Barton  : 1960  MRN: [id number]  Referring provider: Anita Guzman MD  Dx:   Encounter Diagnosis     ICD-10-CM    1  Partial tear of right subscapularis tendon, subsequent encounter S43 81XD                   Subjective: Pt reports that he is doing well  Objective: See treatment diary below      Assessment: Pt continued to had end range restriction with ABD & IR  Pt is now 8 weeks post op and is now able to initiate strengthening  Pt was able to perform strengthening exercise with no increases in pain and without compensations  Pt was instructed on a HEP that focused on maintain motion with addition of strengthening exercises  Pt verbalized and demonstrated understanding        Plan: Continue per plan of care  Progress treatment as tolerated            Precautions: anxiety, HTN, PROTOCOL ACCELERATED       Manual  9/9 9/17 9/24 10/1 10/8 10/15 10/22 10/25 10/29 118   PROM to tolerance   KB KB KB KB KB KB KB KB KB   GH mob for motion gr 3-4       KB KB KB    Abduction muscular re-ed with controlled lowering to 90       KB KB KB    rhythmic stab with overhead activity         KB                     Exercise Diary  9/9 9/17 9/24 10/1 10/8 10/15 10/22 10/25 10/29 118   Pulleys       x5' x5' x5' x5'   Wrist ROM    HEP         scap retraction    HEP         Pendulum   x2 min   HEP         Self stretch on table    x2 min  HEP         AAROM flexion      10s x10 10s x10       AAROM ER     10s x10 10s x10       Standing AAROM abd     10s x10 10s x10       Flex isometric      10s x10 10s x10 10s x10 10s x10 10s x10 D/C   ER isometric      10s x10 10s x10 10s x10 10s x10 10s x10 D/C   ADD isometric       10s   x10 10s x10 10s x10 D/C   ABD isometric      10s x10 10s x10 10s x10 10s x10 D/C   Wall slide with lift off        2x10 3x10 3x10 c 10 lifts in between 3x10 c 10 lifts in between   Standing cane flexion eccentric        2x10 Active 3x10 Active 3x10    Prone Y, T, I        2x10 3x10 3x10 #2 I & T, no weight Y 3x10 #2 I & T, no weight Y   Elbow active ROM        3x10 3x10 #2 3x10 #4   IR behind the back         3x30s    TB IR          R double 3x10   TB ER          Single 3x10   TB rows          RTB 3x10   TB extension           RTB 3x10                    Modalities

## 2019-11-08 NOTE — TELEPHONE ENCOUNTER
Estefany Rodríguez pt - Pt called to inquire about his test results from this week, please call [phone]  Pt is feeling much better

## 2019-11-11 ENCOUNTER — OFFICE VISIT (OUTPATIENT)
Dept: OBGYN CLINIC | Facility: HOSPITAL | Age: 59
End: 2019-11-11

## 2019-11-11 VITALS
SYSTOLIC BLOOD PRESSURE: 148 MMHG | HEIGHT: 70 IN | BODY MASS INDEX: 35.07 KG/M2 | HEART RATE: 73 BPM | WEIGHT: 245 LBS | DIASTOLIC BLOOD PRESSURE: 73 MMHG

## 2019-11-11 DIAGNOSIS — Z47.89 AFTERCARE FOLLOWING SURGERY OF THE MUSCULOSKELETAL SYSTEM: Primary | ICD-10-CM

## 2019-11-11 PROCEDURE — 99024 POSTOP FOLLOW-UP VISIT: CPT | Performed by: PHYSICIAN ASSISTANT

## 2019-11-11 NOTE — PROGRESS NOTES
Assessment:       1  Aftercare following surgery of the musculoskeletal system          Plan:        Patient is doing well postoperatively and is making progress with PT  Operative note, images, and rehab protocol were revisited  All questions were addressed to the patient's satisfaction  Patient will continue with rehab protocol  His to remain out of work until his next evaluation  Follow-up will be in 2  months to assess patient's progress  Subjective:     Patient ID: Saadia Felipe is a 62 y o  male  Chief Complaint:    HPI  Patient presents to the office for two-month follow-up status post right shoulder arthroscopic rotator cuff repair with long head biceps tenodesis, extensive debridement and subacromial decompression performed on the 2019  He states he is doing well and has no new complaints  He is making progress with PT  Social History     Occupational History    Not on file   Tobacco Use    Smoking status: Former Smoker     Last attempt to quit: 2003     Years since quittin 8    Smokeless tobacco: Never Used   Substance and Sexual Activity    Alcohol use: Not Currently     Drinks per session: 1 or 2     Comment: rarely    Drug use: No    Sexual activity: Not on file      Review of Systems   Constitutional: Negative  Respiratory: Negative  Musculoskeletal: Positive for myalgias  Negative for arthralgias  Skin: Negative for wound  Neurological: Positive for weakness  Negative for numbness  Psychiatric/Behavioral: Negative  Objective:     Ortho ExamPhysical Exam   Constitutional: He appears well-developed and well-nourished  HENT:   Head: Atraumatic  Cardiovascular: Intact distal pulses  Pulmonary/Chest: Effort normal    Musculoskeletal:   Active range of motion right shoulder-forward slight flexion 150°, abduction 80°, external rotation 90° and internal rotation to lumbar spine  Skin: Skin is warm and dry   Capillary refill takes less than 2 seconds  Psychiatric: He has a normal mood and affect   His behavior is normal

## 2019-11-11 NOTE — LETTER
November 11, 2019     Patient: Seth Resendez   YOB: 1960   Date of Visit: 11/11/2019       To Whom it May Concern:    Monique Cortez is under my professional care  He had rotator cuff repair, Long Head Biceps Tenodesis, Extensive Debridement and Subacromial Decompression on 09/11/2019  He was seen in my office on 11/11/2019  He is to be out of work until his next evaluation  If you have any questions or concerns, please don't hesitate to call           Sincerely,          Garfield Ngo PA-C        CC: No Recipients

## 2019-11-11 NOTE — PATIENT INSTRUCTIONS
1  Continue PT per protocol  2  No lifting, pulling, or pushing more than 10 lbs    3  Follow-up in 2 months

## 2019-11-12 ENCOUNTER — OFFICE VISIT (OUTPATIENT)
Dept: PHYSICAL THERAPY | Facility: CLINIC | Age: 59
End: 2019-11-12
Payer: COMMERCIAL

## 2019-11-12 DIAGNOSIS — S46.811D PARTIAL TEAR OF RIGHT SUBSCAPULARIS TENDON, SUBSEQUENT ENCOUNTER: Primary | ICD-10-CM

## 2019-11-12 PROCEDURE — 97140 MANUAL THERAPY 1/> REGIONS: CPT | Performed by: PHYSICAL THERAPIST

## 2019-11-12 PROCEDURE — 97110 THERAPEUTIC EXERCISES: CPT | Performed by: PHYSICAL THERAPIST

## 2019-11-12 PROCEDURE — 97112 NEUROMUSCULAR REEDUCATION: CPT | Performed by: PHYSICAL THERAPIST

## 2019-11-12 NOTE — PROGRESS NOTES
Daily Note     Today's date: 2019  Patient name: Allan Rice  : 1960  MRN: [id number]  Referring provider: Tereza Pierre MD  Dx:   Encounter Diagnosis     ICD-10-CM    1  Partial tear of right subscapularis tendon, subsequent encounter S43 81XD                   Subjective: Pt reports that he had no difficulty with the strengthening at home and did not have any muscular soreness  Objective: See treatment diary below      Assessment: Pt's ROM had min end range restriction with flexion abduction and IR which improved post GH mobilization  He was able to progress his program with no increases in pain  He additionally was able to complete ER strengthening with postural musculature strengthening with no compensations or substitutions  Plan: Continue per plan of care  Progress treatment as tolerated            Precautions: anxiety, HTN, PROTOCOL ACCELERATED       Manual              PROM to tolerance  KB            GH mob for motion gr 3-4 KB            Abduction muscular re-ed with controlled lowering to 90             rhythmic stab with overhead activity             Serratus MRE supine KB                Exercise Diary              Pulleys x5'            Wrist ROM             scap retraction             Pendulum              Self stretch on table              AAROM flexion              AAROM ER             Standing AAROM abd             Flex isometric              ER isometric              ADD isometric             ABD isometric             Wall slide with lift off              Standing cane flexion eccentric              Prone Y, T, I  3x10 #4 I & 2x10T,#1 Y            Elbow active ROM 3x10 #5            IR behind the back             TB IR R double 3x10            TB ER Single R 3x10            TB rows GTB 3x10            TB extension  GTB 3x10            Prone row 3x10 #5lbs                Modalities

## 2019-11-22 ENCOUNTER — OFFICE VISIT (OUTPATIENT)
Dept: PHYSICAL THERAPY | Facility: CLINIC | Age: 59
End: 2019-11-22
Payer: COMMERCIAL

## 2019-11-22 DIAGNOSIS — S46.811D PARTIAL TEAR OF RIGHT SUBSCAPULARIS TENDON, SUBSEQUENT ENCOUNTER: Primary | ICD-10-CM

## 2019-11-22 PROCEDURE — 97112 NEUROMUSCULAR REEDUCATION: CPT | Performed by: PHYSICAL THERAPIST

## 2019-11-22 PROCEDURE — 97110 THERAPEUTIC EXERCISES: CPT | Performed by: PHYSICAL THERAPIST

## 2019-11-22 PROCEDURE — 97140 MANUAL THERAPY 1/> REGIONS: CPT | Performed by: PHYSICAL THERAPIST

## 2019-11-22 NOTE — PROGRESS NOTES
Daily Note     Today's date: 2019  Patient name: Shaun Fuchs  : 1960  MRN: [id number]  Referring provider: Levi Hensley MD  Dx:   Encounter Diagnosis     ICD-10-CM    1  Partial tear of right subscapularis tendon, subsequent encounter S43 81XD                   Subjective: Pt reports that he has been compliant with his HEP just has some stiffness when he doesn't use the arm  Objective: See treatment diary below      Assessment: Pt's ROM continued to have minimal end range restrictions with abd and flexion  He fatigued with progressions and is still most challenged with overhead activity and required verbal cues to facilitate appropriate muscular control  Plan: Continue per plan of care  Progress treatment as tolerated            Precautions: anxiety, HTN, PROTOCOL ACCELERATED       Manual             PROM to tolerance  KB KB           GH mob for motion gr 3-4 KB KB           Abduction muscular re-ed with controlled lowering to 90  KB c #1 weight            rhythmic stab with overhead activity  KB           Serratus MRE supine KB KB               Exercise Diary             Pulleys x5' x5'           Wrist ROM             scap retraction             Pendulum              Self stretch on table              AAROM flexion              AAROM ER             Standing AAROM abd             Flex isometric              ER isometric              ADD isometric             ABD isometric             Wall slide with lift off              Standing cane flexion eccentric              Prone Y, T, I  3x10 #4 I & 2x10T,#1 Y 3x10 #4 I & 3x10T,#1 Y           Elbow active ROM 3x10 #5 3x10 #10           IR behind the back             TB IR R double 3x10 blue double 3x10           TB ER Single R 3x10 Single G 3x10           TB rows GTB 3x10 blue TB 3x10           TB extension  GTB 3x10 blue TB 3x10           Prone row 3x10 #5lbs 3x10 #5lbs           SL ER  #4 3x10            body blade   30s x2                            Modalities

## 2019-11-25 ENCOUNTER — OFFICE VISIT (OUTPATIENT)
Dept: FAMILY MEDICINE CLINIC | Facility: CLINIC | Age: 59
End: 2019-11-25
Payer: COMMERCIAL

## 2019-11-25 VITALS
BODY MASS INDEX: 35.65 KG/M2 | OXYGEN SATURATION: 95 % | RESPIRATION RATE: 16 BRPM | SYSTOLIC BLOOD PRESSURE: 134 MMHG | TEMPERATURE: 98.8 F | HEIGHT: 70 IN | WEIGHT: 249 LBS | HEART RATE: 80 BPM | DIASTOLIC BLOOD PRESSURE: 82 MMHG

## 2019-11-25 DIAGNOSIS — Z13.220 SCREENING FOR LIPID DISORDERS: ICD-10-CM

## 2019-11-25 DIAGNOSIS — I10 ESSENTIAL HYPERTENSION: Primary | ICD-10-CM

## 2019-11-25 DIAGNOSIS — Z13.1 SCREENING FOR DIABETES MELLITUS: ICD-10-CM

## 2019-11-25 DIAGNOSIS — Z12.5 SCREENING FOR PROSTATE CANCER: ICD-10-CM

## 2019-11-25 DIAGNOSIS — Z23 NEED FOR INFLUENZA VACCINATION: ICD-10-CM

## 2019-11-25 DIAGNOSIS — K21.9 CHRONIC GERD: ICD-10-CM

## 2019-11-25 DIAGNOSIS — F41.9 ANXIETY: ICD-10-CM

## 2019-11-25 DIAGNOSIS — Z76.89 ENCOUNTER TO ESTABLISH CARE: ICD-10-CM

## 2019-11-25 DIAGNOSIS — Z11.59 ENCOUNTER FOR HEPATITIS C SCREENING TEST FOR LOW RISK PATIENT: ICD-10-CM

## 2019-11-25 PROBLEM — E87.6 HYPOKALEMIA: Status: RESOLVED | Noted: 2017-06-16 | Resolved: 2019-11-25

## 2019-11-25 PROCEDURE — 99396 PREV VISIT EST AGE 40-64: CPT | Performed by: FAMILY MEDICINE

## 2019-11-25 PROCEDURE — 1036F TOBACCO NON-USER: CPT | Performed by: FAMILY MEDICINE

## 2019-11-25 PROCEDURE — 90682 RIV4 VACC RECOMBINANT DNA IM: CPT | Performed by: FAMILY MEDICINE

## 2019-11-25 PROCEDURE — 90471 IMMUNIZATION ADMIN: CPT | Performed by: FAMILY MEDICINE

## 2019-11-25 RX ORDER — ALPRAZOLAM 0.5 MG/1
0.5 TABLET ORAL
Qty: 30 TABLET | Refills: 0 | Status: SHIPPED | OUTPATIENT
Start: 2019-11-25

## 2019-11-25 RX ORDER — LANSOPRAZOLE 30 MG/1
30 CAPSULE, DELAYED RELEASE ORAL DAILY
Qty: 90 CAPSULE | Refills: 2 | Status: SHIPPED | OUTPATIENT
Start: 2019-11-25

## 2019-11-25 NOTE — PROGRESS NOTES
Assessment/Plan:           Problem List Items Addressed This Visit        Cardiovascular and Mediastinum    Hypertension - Primary    Relevant Orders    CBC and differential       Other    Anxiety    Relevant Medications    ALPRAZolam (XANAX) 0 5 mg tablet      Other Visit Diagnoses     Chronic GERD        Relevant Medications    lansoprazole (PREVACID) 30 mg capsule    Encounter to establish care        Need for influenza vaccination        Relevant Orders    influenza vaccine, [id number], quadrivalent, recombinant, PF, 0 5 mL, for patients 18 yr+ (FLUBLOK) (Completed)    Screening for prostate cancer        Relevant Orders    PSA, Total Screen    Screening for lipid disorders        Relevant Orders    Lipid Panel with Direct LDL reflex    Screening for diabetes mellitus        Relevant Orders    Comprehensive metabolic panel    Hemoglobin A1C    Encounter for hepatitis C screening test for low risk patient        Relevant Orders    Hepatitis C antibody            Subjective:      Patient ID: Dior Serna is a 62 y o  male  Pt here to establish care  Pts hx was reviewed and updated as necessary  Pt had recent surgery of R shoulder, he continues PT  Pt has hx of R hip replacement 2 years ago  He is following with an orthopedist  Pt has a family hx of pancreatic cancer  Brother passed away at 62  He has several other members with FH of cancer  Pt denies smoking, he denies alcohol  Pt is UTD with colonoscopy  Pt is on avalide for HTN  Pressure today is 142/82 -recheck 134/82  Pt has a hx of precancerous GERD, he is to be on PPI for life  He is UTD with his EGD  He follows with gastro  States he wants to lose weight  He gained weight since he retired and shoulder surgery  He has anxiety and is on Xanax as needed  Dr Sabas Vazquez was having him take it 2x/day but he cut back since starting CBD oil     Pt has no acute complaints today      The following portions of the patient's history were reviewed and updated as appropriate:   He  has a past medical history of Anxiety, CPAP (continuous positive airway pressure) dependence, Diverticulitis, GERD (gastroesophageal reflux disease), Hypertension, and Sleep apnea  He   Patient Active Problem List    Diagnosis Date Noted    Aftercare following surgery of the musculoskeletal system 11/11/2019    Biceps tendinitis of right shoulder 02/19/2019    Impingement syndrome of left shoulder 12/04/2018    Partial tear of right subscapularis tendon 11/13/2018    Gastroesophageal reflux disease 09/14/2018    Hypertension     GERD (gastroesophageal reflux disease)     Anxiety      He  has a past surgical history that includes Cholecystectomy; Knee arthroscopy; Colectomy; pr esophagogastroduodenoscopy transoral diagnostic (N/A, 3/1/2017); Replacement total hip w/  resurfacing implants; pr esophagogastroduodenoscopy transoral diagnostic (N/A, 9/19/2018); Joint replacement (Right); Colonoscopy; and pr shldr arthroscop,surg,w/rotat cuff repr (Right, 9/11/2019)  His family history is not on file  He  reports that he quit smoking about 16 years ago  He has never used smokeless tobacco  He reports that he drank alcohol  He reports that he does not use drugs  Current Outpatient Medications   Medication Sig Dispense Refill    ALPRAZolam (XANAX) 0 5 mg tablet Take 1 tablet (0 5 mg total) by mouth daily at bedtime as needed for anxiety 30 tablet 0    irbesartan-hydrochlorothiazide (AVALIDE) 150-12 5 MG per tablet Take 1 tablet by mouth daily in the early morning       lansoprazole (PREVACID) 30 mg capsule Take 1 capsule (30 mg total) by mouth daily 90 capsule 2    Multiple Vitamins-Minerals (MULTIVITAMIN ADULT PO) Take 1 tablet by mouth      tobramycin-dexamethasone (TOBRADEX) ophthalmic suspension tobramycin 0 3 %-dexamethasone 0 1 % eye drops,suspension       No current facility-administered medications for this visit        Current Outpatient Medications on File Prior to Visit Medication Sig    irbesartan-hydrochlorothiazide (AVALIDE) 150-12 5 MG per tablet Take 1 tablet by mouth daily in the early morning     Multiple Vitamins-Minerals (MULTIVITAMIN ADULT PO) Take 1 tablet by mouth    tobramycin-dexamethasone (TOBRADEX) ophthalmic suspension tobramycin 0 3 %-dexamethasone 0 1 % eye drops,suspension    [DISCONTINUED] ALPRAZolam (XANAX) 0 5 mg tablet Take 1 tablet (0 5 mg total) by mouth daily at bedtime as needed for anxiety    [DISCONTINUED] lansoprazole (PREVACID) 30 mg capsule Take 1 capsule (30 mg total) by mouth daily     No current facility-administered medications on file prior to visit  He has No Known Allergies       Review of Systems   Constitutional: Positive for unexpected weight change  Negative for activity change, appetite change, chills, fatigue and fever  HENT: Negative for congestion, ear discharge, ear pain, postnasal drip, sinus pressure and sore throat  Eyes: Negative for discharge and visual disturbance  Respiratory: Negative for cough, shortness of breath and wheezing  Cardiovascular: Negative for chest pain, palpitations and leg swelling  Gastrointestinal: Negative for abdominal pain, constipation, diarrhea, nausea and vomiting  Renell Quarto   Endocrine: Negative for cold intolerance, heat intolerance, polydipsia and polyuria  Genitourinary: Negative for difficulty urinating and frequency  Musculoskeletal: Positive for arthralgias  Negative for back pain, joint swelling and myalgias  Skin: Negative for rash  Neurological: Negative for dizziness, weakness, light-headedness, numbness and headaches  Hematological: Negative for adenopathy  Psychiatric/Behavioral: Negative for behavioral problems, confusion, dysphoric mood, sleep disturbance and suicidal ideas  The patient is not nervous/anxious            Objective:      /82   Pulse 80   Temp 98 8 °F (37 1 °C)   Resp 16   Ht 5' 10" (1 778 m)   Wt 113 kg (249 lb)   SpO2 95% BMI 35 73 kg/m²          Physical Exam   Constitutional: He is oriented to person, place, and time  He appears well-developed and well-nourished  No distress  HENT:   Head: Normocephalic and atraumatic  Right Ear: Hearing, tympanic membrane, external ear and ear canal normal    Left Ear: Hearing, tympanic membrane, external ear and ear canal normal    Nose: Nose normal    Mouth/Throat: Oropharynx is clear and moist and mucous membranes are normal  No oropharyngeal exudate  Eyes: Pupils are equal, round, and reactive to light  Conjunctivae and EOM are normal    Neck: Neck supple  No thyromegaly present  Cardiovascular: Normal rate, regular rhythm and normal heart sounds  Exam reveals no gallop and no friction rub  No murmur heard  Pulmonary/Chest: Effort normal and breath sounds normal  No respiratory distress  He has no wheezes  He has no rales  He exhibits no tenderness  Abdominal: Soft  Bowel sounds are normal  He exhibits no distension and no mass  There is no tenderness  There is no rebound and no guarding  Musculoskeletal: Normal range of motion  He exhibits no edema  Lymphadenopathy:     He has no cervical adenopathy  Neurological: He is alert and oriented to person, place, and time  Skin: Skin is warm and dry  No rash noted  He is not diaphoretic  Psychiatric: He has a normal mood and affect  His behavior is normal  Judgment and thought content normal    Nursing note and vitals reviewed  BMI Counseling: Body mass index is 35 73 kg/m²  The BMI is above normal  Nutrition recommendations include reducing portion sizes, decreasing overall calorie intake and 3-5 servings of fruits/vegetables daily  Exercise recommendations include exercising 3-5 times per week

## 2019-11-26 ENCOUNTER — OFFICE VISIT (OUTPATIENT)
Dept: PHYSICAL THERAPY | Facility: CLINIC | Age: 59
End: 2019-11-26
Payer: COMMERCIAL

## 2019-11-26 DIAGNOSIS — S46.811D PARTIAL TEAR OF RIGHT SUBSCAPULARIS TENDON, SUBSEQUENT ENCOUNTER: Primary | ICD-10-CM

## 2019-11-26 PROCEDURE — 97112 NEUROMUSCULAR REEDUCATION: CPT | Performed by: PHYSICAL THERAPIST

## 2019-11-26 PROCEDURE — 97110 THERAPEUTIC EXERCISES: CPT | Performed by: PHYSICAL THERAPIST

## 2019-11-26 PROCEDURE — 97140 MANUAL THERAPY 1/> REGIONS: CPT | Performed by: PHYSICAL THERAPIST

## 2019-11-26 NOTE — PROGRESS NOTES
Daily Note     Today's date: 2019  Patient name: Joie Coronado  : 1960  MRN: [id number]  Referring provider: Radha Martin MD  Dx:   Encounter Diagnosis     ICD-10-CM    1  Partial tear of right subscapularis tendon, subsequent encounter S43 81XD                   Subjective: Pt reports that he has been feeling good  Objective: See treatment diary below      Assessment: Pt's had difficulty with prone low trap exercise and is unable to achieve full ROM  Assessed thoracic mobility which was hypomobile  Preformed gr4-5 to the thoracic which did not improve motion  Likely all related to strength  Required moderate amount of tactile cues in order to facilitate low trap activation  Plan: Continue per plan of care  Progress treatment as tolerated            Precautions: anxiety, HTN, PROTOCOL ACCELERATED       Manual            PROM to tolerance  KB KB KB          GH mob for motion gr 3-4 KB KB KB + thoracic gr4-5          Abduction muscular re-ed with controlled lowering to 90  KB c #1 weight            rhythmic stab with overhead activity  KB           Serratus MRE supine KB KB KB              Exercise Diary            Pulleys x5' x5' x5'          Wrist ROM             scap retraction             Pendulum              Self stretch on table              AAROM flexion              AAROM ER             Standing AAROM abd             Flex isometric              ER isometric              ADD isometric             ABD isometric             Wall slide with lift off              Standing cane flexion eccentric              Prone Y, T, I  3x10 #4 I & 2x10T,#1 Y 3x10 #4 I & 3x10T,#1 Y 3x10 #4 I & 3x10T,#2 Y          Elbow active ROM 3x10 #5 3x10 #10 3x10 #10          IR behind the back             TB IR R double 3x10 blue double 3x10           TB ER Single R 3x10 Single G 3x10           TB rows GTB 3x10 blue TB 3x10           TB extension  GTB 3x10 blue TB 3x10 Prone row 3x10 #5lbs 3x10 #5lbs 3x10 #10lbs          SL ER  #4 3x10 #4 3x10           body blade   30s x2 30s x2 @ elbow bent & straight           Serratus wall slide   RTB 3x10 c roller          Upward chop    YTB 3x10              Modalities

## 2019-11-27 ENCOUNTER — APPOINTMENT (OUTPATIENT)
Dept: LAB | Facility: CLINIC | Age: 59
End: 2019-11-27
Payer: COMMERCIAL

## 2019-11-27 DIAGNOSIS — I10 ESSENTIAL HYPERTENSION: ICD-10-CM

## 2019-11-27 DIAGNOSIS — Z13.220 SCREENING FOR LIPID DISORDERS: ICD-10-CM

## 2019-11-27 DIAGNOSIS — Z11.59 ENCOUNTER FOR HEPATITIS C SCREENING TEST FOR LOW RISK PATIENT: ICD-10-CM

## 2019-11-27 DIAGNOSIS — Z12.5 SCREENING FOR PROSTATE CANCER: ICD-10-CM

## 2019-11-27 DIAGNOSIS — Z13.1 SCREENING FOR DIABETES MELLITUS: ICD-10-CM

## 2019-11-27 LAB
ALBUMIN SERPL BCP-MCNC: 4 G/DL (ref 3.5–5)
ALP SERPL-CCNC: 67 U/L (ref 46–116)
ALT SERPL W P-5'-P-CCNC: 40 U/L (ref 12–78)
ANION GAP SERPL CALCULATED.3IONS-SCNC: 7 MMOL/L (ref 4–13)
AST SERPL W P-5'-P-CCNC: 19 U/L (ref 5–45)
BASOPHILS # BLD AUTO: 0.05 THOUSANDS/ΜL (ref 0–0.1)
BASOPHILS NFR BLD AUTO: 1 % (ref 0–1)
BILIRUB SERPL-MCNC: 0.43 MG/DL (ref 0.2–1)
BUN SERPL-MCNC: 15 MG/DL (ref 5–25)
CALCIUM SERPL-MCNC: 9 MG/DL (ref 8.3–10.1)
CHLORIDE SERPL-SCNC: 105 MMOL/L (ref 100–108)
CHOLEST SERPL-MCNC: 183 MG/DL (ref 50–200)
CO2 SERPL-SCNC: 25 MMOL/L (ref 21–32)
CREAT SERPL-MCNC: 0.96 MG/DL (ref 0.6–1.3)
EOSINOPHIL # BLD AUTO: 0.24 THOUSAND/ΜL (ref 0–0.61)
EOSINOPHIL NFR BLD AUTO: 4 % (ref 0–6)
ERYTHROCYTE [DISTWIDTH] IN BLOOD BY AUTOMATED COUNT: 12.8 % (ref 11.6–15.1)
EST. AVERAGE GLUCOSE BLD GHB EST-MCNC: 148 MG/DL
GFR SERPL CREATININE-BSD FRML MDRD: 87 ML/MIN/1.73SQ M
GLUCOSE P FAST SERPL-MCNC: 118 MG/DL (ref 65–99)
HBA1C MFR BLD: 6.8 % (ref 4.2–6.3)
HCT VFR BLD AUTO: 45.3 % (ref 36.5–49.3)
HCV AB SER QL: NORMAL
HDLC SERPL-MCNC: 20 MG/DL
HGB BLD-MCNC: 16.1 G/DL (ref 12–17)
IMM GRANULOCYTES # BLD AUTO: 0.03 THOUSAND/UL (ref 0–0.2)
IMM GRANULOCYTES NFR BLD AUTO: 1 % (ref 0–2)
LDLC SERPL DIRECT ASSAY-MCNC: 56 MG/DL (ref 0–100)
LYMPHOCYTES # BLD AUTO: 1.44 THOUSANDS/ΜL (ref 0.6–4.47)
LYMPHOCYTES NFR BLD AUTO: 22 % (ref 14–44)
MCH RBC QN AUTO: 31.4 PG (ref 26.8–34.3)
MCHC RBC AUTO-ENTMCNC: 35.5 G/DL (ref 31.4–37.4)
MCV RBC AUTO: 88 FL (ref 82–98)
MONOCYTES # BLD AUTO: 0.59 THOUSAND/ΜL (ref 0.17–1.22)
MONOCYTES NFR BLD AUTO: 9 % (ref 4–12)
NEUTROPHILS # BLD AUTO: 4.17 THOUSANDS/ΜL (ref 1.85–7.62)
NEUTS SEG NFR BLD AUTO: 63 % (ref 43–75)
NRBC BLD AUTO-RTO: 0 /100 WBCS
PLATELET # BLD AUTO: 226 THOUSANDS/UL (ref 149–390)
PMV BLD AUTO: 11 FL (ref 8.9–12.7)
POTASSIUM SERPL-SCNC: 3.6 MMOL/L (ref 3.5–5.3)
PROT SERPL-MCNC: 7.2 G/DL (ref 6.4–8.2)
PSA SERPL-MCNC: 1.5 NG/ML (ref 0–4)
RBC # BLD AUTO: 5.13 MILLION/UL (ref 3.88–5.62)
SODIUM SERPL-SCNC: 137 MMOL/L (ref 136–145)
TRIGL SERPL-MCNC: 998 MG/DL
WBC # BLD AUTO: 6.52 THOUSAND/UL (ref 4.31–10.16)

## 2019-11-27 PROCEDURE — 86803 HEPATITIS C AB TEST: CPT

## 2019-11-27 PROCEDURE — G0103 PSA SCREENING: HCPCS

## 2019-11-27 PROCEDURE — 83721 ASSAY OF BLOOD LIPOPROTEIN: CPT

## 2019-11-27 PROCEDURE — 85025 COMPLETE CBC W/AUTO DIFF WBC: CPT

## 2019-11-27 PROCEDURE — 80053 COMPREHEN METABOLIC PANEL: CPT

## 2019-11-27 PROCEDURE — 36415 COLL VENOUS BLD VENIPUNCTURE: CPT

## 2019-11-27 PROCEDURE — 80061 LIPID PANEL: CPT

## 2019-11-27 PROCEDURE — 83036 HEMOGLOBIN GLYCOSYLATED A1C: CPT

## 2019-11-29 ENCOUNTER — APPOINTMENT (OUTPATIENT)
Dept: PHYSICAL THERAPY | Facility: CLINIC | Age: 59
End: 2019-11-29
Payer: COMMERCIAL

## 2019-12-03 ENCOUNTER — OFFICE VISIT (OUTPATIENT)
Dept: PHYSICAL THERAPY | Facility: CLINIC | Age: 59
End: 2019-12-03
Payer: COMMERCIAL

## 2019-12-03 DIAGNOSIS — S46.811D PARTIAL TEAR OF RIGHT SUBSCAPULARIS TENDON, SUBSEQUENT ENCOUNTER: Primary | ICD-10-CM

## 2019-12-03 PROCEDURE — 97112 NEUROMUSCULAR REEDUCATION: CPT | Performed by: PHYSICAL THERAPIST

## 2019-12-03 PROCEDURE — 97110 THERAPEUTIC EXERCISES: CPT | Performed by: PHYSICAL THERAPIST

## 2019-12-03 PROCEDURE — 97140 MANUAL THERAPY 1/> REGIONS: CPT | Performed by: PHYSICAL THERAPIST

## 2019-12-03 NOTE — PROGRESS NOTES
Daily Note     Today's date: 12/3/2019  Patient name: Ursula Raymond  : 1960  MRN: [id number]  Referring provider: Gracie Duarte MD  Dx:   Encounter Diagnosis     ICD-10-CM    1  Partial tear of right subscapularis tendon, subsequent encounter S43 81XD                   Subjective: Pt reports that he has been feeling good  Objective: See treatment diary below      Assessment: Pt continued to have difficulty with low trap facilitation  Trial of with B/L prone Y to help facilitate low trap activation with use of both LT  Integrated overhead work which patient was fatigued with as demonstrated by frequent rest breaks t/o session  Plan: Continue per plan of care  Progress treatment as tolerated            Precautions: anxiety, HTN, PROTOCOL ACCELERATED       Manual  11/12 11/22 11/26 12/3         PROM to tolerance  KB KB KB          GH mob for motion gr 3-4 KB KB KB + thoracic gr4-5          Abduction muscular re-ed with controlled lowering to 90  KB c #1 weight            rhythmic stab with overhead activity  KB           Serratus MRE supine KB KB KB              Exercise Diary  11/12 11/22 11/26 12/3         Pulleys x5' x5' x5' x5'         Wrist ROM             scap retraction             Pendulum              Self stretch on table              AAROM flexion              AAROM ER             Standing AAROM abd             Flex isometric              ER isometric              ADD isometric             ABD isometric             Wall slide with lift off              Standing cane flexion eccentric              Prone Y, T, I  3x10 #4 I & 2x10T,#1 Y 3x10 #4 I & 3x10T,#1 Y 3x10 #4 I & 3x10T,#2 Y 3x10, I 5, T4, Y no weight B/L OH         Elbow active ROM 3x10 #5 3x10 #10 3x10 #10 HEP         IR behind the back             TB IR R double 3x10 blue double 3x10           TB ER Single R 3x10 Single G 3x10           TB rows GTB 3x10 blue TB 3x10           TB extension  GTB 3x10 blue TB 3x10 Prone row 3x10 #5lbs 3x10 #5lbs 3x10 #10lbs 3x10 #15lbs         SL ER  #4 3x10 #4 3x10 #5 3x10          body blade   30s x2 30s x2 @ elbow bent & straight  3x30s at 90deg, elbow straigh, OH         Serratus wall slide   RTB 3x10 c roller RTB 3x10 c roller         Upward chop    YTB 3x10 YTB 3x10         Overhead reaching to cabinet with lift off at end    #4 3x10         No money against wall     10s x10                          Modalities

## 2019-12-06 ENCOUNTER — OFFICE VISIT (OUTPATIENT)
Dept: PHYSICAL THERAPY | Facility: CLINIC | Age: 59
End: 2019-12-06
Payer: COMMERCIAL

## 2019-12-06 DIAGNOSIS — S46.811D PARTIAL TEAR OF RIGHT SUBSCAPULARIS TENDON, SUBSEQUENT ENCOUNTER: Primary | ICD-10-CM

## 2019-12-06 PROCEDURE — 97010 HOT OR COLD PACKS THERAPY: CPT | Performed by: PHYSICAL THERAPIST

## 2019-12-06 NOTE — PROGRESS NOTES
Daily Note     Today's date: 2019  Patient name: Geni Grace  : 1960  MRN: [id number]  Referring provider: Philippe Osuna MD  Dx:   Encounter Diagnosis     ICD-10-CM    1  Partial tear of right subscapularis tendon, subsequent encounter S43 81XD                   Subjective: Pt reports that he was sore for while after his session, had some pain which subsided  Objective: See treatment diary below    Assessment: Regressed overhead lifting exercise secondary to pain  He is most fatigued with low trap strengthening  No pain post tx session today  Trail of heat today to reduce post exercise soreness  Plan: Continue per plan of care  Progress treatment as tolerated            Precautions: anxiety, HTN, PROTOCOL ACCELERATED       Manual  11/12 11/22 11/26 12/3 12/6        PROM to tolerance  KB KB KB          GH mob for motion gr 3-4 KB KB KB + thoracic gr4-5          Abduction muscular re-ed with controlled lowering to 90  KB c #1 weight            rhythmic stab with overhead activity  KB           Serratus MRE supine KB KB KB              Exercise Diary  11/12 11/22 11/26 12/3 12/6        Pulleys x5' x5' x5' x5' UBE x10        Wrist ROM             scap retraction             Pendulum              Self stretch on table              AAROM flexion              AAROM ER             Standing AAROM abd             Flex isometric              ER isometric              ADD isometric             ABD isometric             Wall slide with lift off              Standing cane flexion eccentric              Prone Y, T, I  3x10 #4 I & 2x10T,#1 Y 3x10 #4 I & 3x10T,#1 Y 3x10 #4 I & 3x10T,#2 Y 3x10, I 5, T4, Y no weight B/L OH 3x10, I 5, T4, Y no weight B/L OH thumb up, & palm down        Elbow active ROM 3x10 #5 3x10 #10 3x10 #10 HEP         IR behind the back             TB IR R double 3x10 blue double 3x10           TB ER Single R 3x10 Single G 3x10           TB rows GTB 3x10 blue TB 3x10           TB extension  GTB 3x10 blue TB 3x10           Prone row 3x10 #5lbs 3x10 #5lbs 3x10 #10lbs 3x10 #15lbs 3x10 #15lbs        SL ER  #4 3x10 #4 3x10 #5 3x10 #5 3x10         body blade   30s x2 30s x2 @ elbow bent & straight  3x30s at 90deg, elbow straigh, OH 3x30s at 90deg, elbow straigh, OH        Serratus wall slide   RTB 3x10 c roller RTB 3x10 c roller RTB 3x10 c roller        Upward chop    YTB 3x10 YTB 3x10 YTB 3x10        Overhead reaching to cabinet with lift off at end    #4 3x10 #2 3x10        No money against wall     10s x10 3x10                         Modalities  12/6            MH to shoulder x10'

## 2019-12-10 ENCOUNTER — OFFICE VISIT (OUTPATIENT)
Dept: PHYSICAL THERAPY | Facility: CLINIC | Age: 59
End: 2019-12-10
Payer: COMMERCIAL

## 2019-12-10 DIAGNOSIS — S46.811D PARTIAL TEAR OF RIGHT SUBSCAPULARIS TENDON, SUBSEQUENT ENCOUNTER: Primary | ICD-10-CM

## 2019-12-10 PROCEDURE — 97110 THERAPEUTIC EXERCISES: CPT | Performed by: PHYSICAL THERAPIST

## 2019-12-10 NOTE — PROGRESS NOTES
Daily Note     Today's date: 12/10/2019  Patient name: Joie Coronado  : 1960  MRN: [id number]  Referring provider: Radha Martin MD  Dx:   Encounter Diagnosis     ICD-10-CM    1  Partial tear of right subscapularis tendon, subsequent encounter S43 81XD                   Subjective: Pt reports that he had some pinching when he was shaving in the shoulder  He notes that this is the same pain that he was having before the surgery but not as intense  Objective: See treatment diary below    Assessment: Re-assessed [facility] special tests and was negative on all tests such as drop arm, infraspinatus testing, negative painful arc  He does lack some IR motion with weakness in the periscapular musculature which I believe is contributing to his pain  Plan: Continue per plan of care  Progress treatment as tolerated            Precautions: anxiety, HTN, PROTOCOL ACCELERATED       Manual  11/12 11/22 11/26 12/3 12/6 10/10       PROM to tolerance  KB KB KB   IR ROM       GH mob for motion gr 3-4 KB KB KB + thoracic gr4-5          Abduction muscular re-ed with controlled lowering to 90  KB c #1 weight            rhythmic stab with overhead activity  KB           Serratus MRE supine KB KB KB              Exercise Diary  11/12 11/22 11/26 12/3 12/6 12/10       Pulleys x5' x5' x5' x5' UBE x10 UBE x6'       Wrist ROM             scap retraction             Pendulum              Self stretch on table              AAROM flexion              AAROM ER             Standing AAROM abd             Flex isometric              ER isometric              ADD isometric             ABD isometric             Wall slide with lift off              Standing cane flexion eccentric              Prone Y, T, I  3x10 #4 I & 2x10T,#1 Y 3x10 #4 I & 3x10T,#1 Y 3x10 #4 I & 3x10T,#2 Y 3x10, I 5, T4, Y no weight B/L OH 3x10, I 5, T4, Y no weight B/L OH thumb up, & palm down B/L OH thumb up, & palm down       Elbow active ROM 3x10 #5 3x10 #10 3x10 #10 HEP         IR behind the back             TB IR R double 3x10 blue double 3x10           TB ER Single R 3x10 Single G 3x10           TB rows GTB 3x10 blue TB 3x10           TB extension  GTB 3x10 blue TB 3x10           Prone row 3x10 #5lbs 3x10 #5lbs 3x10 #10lbs 3x10 #15lbs 3x10 #15lbs        SL ER  #4 3x10 #4 3x10 #5 3x10 #5 3x10         body blade   30s x2 30s x2 @ elbow bent & straight  3x30s at 90deg, elbow straigh, OH 3x30s at 90deg, elbow straigh, OH 3x30s at 90deg, elbow straigh, OH       Serratus wall slide   RTB 3x10 c roller RTB 3x10 c roller RTB 3x10 c roller RTB 3x10 c roller       Upward chop    YTB 3x10 YTB 3x10 YTB 3x10 YTB 3x10       Overhead reaching to cabinet with lift off at end    #4 3x10 #2 3x10 np       No money against wall     10s x10 3x10 3x10 gtb                        Modalities  12/6            MH to shoulder x10'

## 2019-12-12 ENCOUNTER — OFFICE VISIT (OUTPATIENT)
Dept: PHYSICAL THERAPY | Facility: CLINIC | Age: 59
End: 2019-12-12
Payer: COMMERCIAL

## 2019-12-12 DIAGNOSIS — S46.811D PARTIAL TEAR OF RIGHT SUBSCAPULARIS TENDON, SUBSEQUENT ENCOUNTER: Primary | ICD-10-CM

## 2019-12-12 PROCEDURE — 97140 MANUAL THERAPY 1/> REGIONS: CPT

## 2019-12-12 PROCEDURE — 97110 THERAPEUTIC EXERCISES: CPT

## 2019-12-12 NOTE — PROGRESS NOTES
Daily Note     Today's date: 2019  Patient name: Bernard Lux  : 1960  MRN: [id number]  Referring provider: Matias Acosta MD  Dx:   Encounter Diagnosis     ICD-10-CM    1  Partial tear of right subscapularis tendon, subsequent encounter S43 81XD        Start Time: 1530  Stop Time: 1610  Total time in clinic (min): 40 minutes    Subjective: Patient denies pain  Objective: See treatment diary below    Assessment: Patient able to complete all TE with no increase in pain  Continued with the current exercise program since a few visits ago he experienced increased pain  He seems to be feeling better since his last flare up  Progress as able per protocol  Plan: Continue per plan of care  Progress treatment as tolerated         Precautions: anxiety, HTN, PROTOCOL ACCELERATED     Manual  11/12 11/22 11/26 12/3 12/6 10/10 12/12      PROM to tolerance  KB KB KB   IR ROM IR  ROM      GH mob for motion gr 3-4 KB KB KB + thoracic gr4-5          Abduction muscular re-ed with controlled lowering to 90  KB c #1 weight            rhythmic stab with overhead activity  KB           Serratus MRE supine KB KB KB            Exercise Diary  11/12 11/22 11/26 12/3 12/6 12/10 12/12      Pulleys x5' x5' x5' x5' UBE x10 UBE x6' UBE  6'      Wrist ROM             scap retraction             Pendulum              Self stretch on table              AAROM flexion              AAROM ER             Standing AAROM abd             Flex isometric              ER isometric              ADD isometric             ABD isometric             Wall slide with lift off              Standing cane flexion eccentric              Prone Y, T, I  3x10 #4 I & 2x10T,#1 Y 3x10 #4 I & 3x10T,#1 Y 3x10 #4 I & 3x10T,#2 Y 3x10, I 5, T4, Y no weight B/L OH 3x10, I 5, T4, Y no weight B/L OH thumb up, & palm down B/L OH thumb up, & palm down B/L OH thumb up, & palm down  3x10      Elbow active ROM 3x10 #5 3x10 #10 3x10 #10 HEP         IR behind the back             TB IR R double 3x10 blue double 3x10           TB ER Single R 3x10 Single G 3x10           TB rows GTB 3x10 blue TB 3x10           TB extension  GTB 3x10 blue TB 3x10           Prone row 3x10 #5lbs 3x10 #5lbs 3x10 #10lbs 3x10 #15lbs 3x10 #15lbs        SL ER  #4 3x10 #4 3x10 #5 3x10 #5 3x10         body blade   30s x2 30s x2 @ elbow bent & straight  3x30s at 90deg, elbow straigh, OH 3x30s at 90deg, elbow straigh, OH 3x30s at 90deg, elbow straigh, OH 3x30s at 90deg, elbow straigh, OH      Serratus wall slide   RTB 3x10 c roller RTB 3x10 c roller RTB 3x10 c roller RTB 3x10 c roller RTB 3x10 w/  roller      Upward chop    YTB 3x10 YTB 3x10 YTB 3x10 YTB 3x10 YTB  3x10      Overhead reaching to cabinet with lift off at end    #4 3x10 #2 3x10 np       No money against wall     10s x10 3x10 3x10 gtb GTB  3x10                     Modalities  12/6            MH to shoulder x10'

## 2019-12-13 ENCOUNTER — APPOINTMENT (OUTPATIENT)
Dept: PHYSICAL THERAPY | Facility: CLINIC | Age: 59
End: 2019-12-13
Payer: COMMERCIAL

## 2019-12-17 ENCOUNTER — OFFICE VISIT (OUTPATIENT)
Dept: PHYSICAL THERAPY | Facility: CLINIC | Age: 59
End: 2019-12-17
Payer: COMMERCIAL

## 2019-12-17 DIAGNOSIS — S46.811D PARTIAL TEAR OF RIGHT SUBSCAPULARIS TENDON, SUBSEQUENT ENCOUNTER: Primary | ICD-10-CM

## 2019-12-17 PROCEDURE — 97530 THERAPEUTIC ACTIVITIES: CPT | Performed by: PHYSICAL THERAPIST

## 2019-12-17 PROCEDURE — 97110 THERAPEUTIC EXERCISES: CPT | Performed by: PHYSICAL THERAPIST

## 2019-12-17 PROCEDURE — 97140 MANUAL THERAPY 1/> REGIONS: CPT | Performed by: PHYSICAL THERAPIST

## 2019-12-17 NOTE — PROGRESS NOTES
Daily Note     Today's date: 2019  Patient name: Alice Carreon  : 1960  MRN: [id number]  Referring provider: Erasto Hankins MD  Dx:   Encounter Diagnosis     ICD-10-CM    1  Partial tear of right subscapularis tendon, subsequent encounter S43 81XD                   Subjective: Patient reports that he may have slept wrong because his other shoulder was bothering him  Objective: See treatment diary below    Assessment: Pt continued to have some difficulty with IR AROM  Performed IR mobilization with movement followed by partial break down of IR stretching to improve overall quality of motion  Post manual work he went from IR at the sacrum to upper lumbar  Plan: Continue per plan of care  Progress treatment as tolerated         Precautions: anxiety, HTN, PROTOCOL ACCELERATED     Manual  11/12 11/22 11/26 12/3 12/6 10/10 12/12 12/17     PROM to tolerance  KB KB KB   IR ROM IR  ROM IR mob c movement + breakdown stretching     GH mob for motion gr 3-4 KB KB KB + thoracic gr4-5          Abduction muscular re-ed with controlled lowering to 90  KB c #1 weight            rhythmic stab with overhead activity  KB           Serratus MRE supine KB KB KB            Exercise Diary  11/12 11/22 11/26 12/3 12/6 12/10 12/12 12/17     Pulleys x5' x5' x5' x5' UBE x10 UBE x6' UBE  6'      Wrist ROM             scap retraction             Pendulum              Self stretch on table              AAROM flexion              AAROM ER             Standing AAROM abd             Flex isometric              ER isometric              ADD isometric             ABD isometric             Wall slide with lift off              Standing cane flexion eccentric              Prone Y, T, I  3x10 #4 I & 2x10T,#1 Y 3x10 #4 I & 3x10T,#1 Y 3x10 #4 I & 3x10T,#2 Y 3x10, I 5, T4, Y no weight B/L OH 3x10, I 5, T4, Y no weight B/L OH thumb up, & palm down B/L OH thumb up, & palm down B/L OH thumb up, & palm down  3x10 B/L OH thumb up, & palm down  3x10     Elbow active ROM 3x10 #5 3x10 #10 3x10 #10 HEP         IR behind the back             TB IR R double 3x10 blue double 3x10           TB ER Single R 3x10 Single G 3x10           TB rows GTB 3x10 blue TB 3x10           TB extension  GTB 3x10 blue TB 3x10           Prone row 3x10 #5lbs 3x10 #5lbs 3x10 #10lbs 3x10 #15lbs 3x10 #15lbs        SL ER  #4 3x10 #4 3x10 #5 3x10 #5 3x10         body blade   30s x2 30s x2 @ elbow bent & straight  3x30s at 90deg, elbow straigh, OH 3x30s at 90deg, elbow straigh, OH 3x30s at 90deg, elbow straigh, OH 3x30s at 90deg, elbow straigh, OH 3x30s at 90deg, elbow straigh, OH     Serratus wall slide   RTB 3x10 c roller RTB 3x10 c roller RTB 3x10 c roller RTB 3x10 c roller RTB 3x10 w/  roller RTB 3x10 w/  roller     Upward chop    YTB 3x10 YTB 3x10 YTB 3x10 YTB 3x10 YTB  3x10 YTB  3x10 + downward     Overhead reaching to cabinet with lift off at end    #4 3x10 #2 3x10 np  #2 3x10     No money against wall     10s x10 3x10 3x10 gtb GTB  3x10 GTB  3x10     End range ext against wall TB at wrist         YTB 3x10     DB carry @ 90/90         #5 x30ft x3 laps     Box carrying        #15lbs x3 laps     Robberies         Gr 3x10       Modalities  12/6            MH to shoulder x10'

## 2019-12-20 ENCOUNTER — APPOINTMENT (OUTPATIENT)
Dept: PHYSICAL THERAPY | Facility: CLINIC | Age: 59
End: 2019-12-20
Payer: COMMERCIAL

## 2019-12-23 ENCOUNTER — OFFICE VISIT (OUTPATIENT)
Dept: PHYSICAL THERAPY | Facility: CLINIC | Age: 59
End: 2019-12-23
Payer: COMMERCIAL

## 2019-12-23 DIAGNOSIS — S46.811D PARTIAL TEAR OF RIGHT SUBSCAPULARIS TENDON, SUBSEQUENT ENCOUNTER: Primary | ICD-10-CM

## 2019-12-23 PROCEDURE — 97530 THERAPEUTIC ACTIVITIES: CPT | Performed by: PHYSICAL THERAPIST

## 2019-12-23 PROCEDURE — 97110 THERAPEUTIC EXERCISES: CPT | Performed by: PHYSICAL THERAPIST

## 2019-12-23 NOTE — PROGRESS NOTES
Daily Note     Today's date: 2019  Patient name: Soledad Montero  : 1960  MRN: [id number]  Referring provider: Nichelle Luna MD  Dx:   Encounter Diagnosis     ICD-10-CM    1  Partial tear of right subscapularis tendon, subsequent encounter [id number]        Start Time: 1500  Stop Time: 1548  Total time in clinic (min): 48 minutes    Subjective: Patient reports that he was doing a lot of lifting for moving and his bicep hurt which is why he had to cancel last visit  He notes that he will see his MD before next visit  Objective: See treatment diary below    Assessment: Pt was some continued weakness with low trap and relies on VC to inhibit compensations  He demonstrated no difficulty with OH activity despite increased weight  Will d/c NV pending MD visit  Plan: Potential discharge next visit       Precautions: anxiety, HTN, PROTOCOL ACCELERATED     Manual  11/12 11/22 11/26 12/3 12/6 10/10 12/12 12/17 12/23    PROM to tolerance  KB KB KB   IR ROM IR  ROM IR mob c movement + breakdown stretching     GH mob for motion gr 3-4 KB KB KB + thoracic gr4-5          Abduction muscular re-ed with controlled lowering to 90  KB c #1 weight            rhythmic stab with overhead activity  KB           Serratus MRE supine KB KB KB            Exercise Diary  11/12 11/22 11/26 12/3 12/6 12/10 12/12 12/17 12/23    Pulleys x5' x5' x5' x5' UBE x10 UBE x6' UBE  6'  UBE  8'    Wrist ROM             scap retraction             Pendulum              Self stretch on table              AAROM flexion              AAROM ER             Standing AAROM abd             Flex isometric              ER isometric              ADD isometric             ABD isometric             Wall slide with lift off              Standing cane flexion eccentric              Prone Y, T, I  3x10 #4 I & 2x10T,#1 Y 3x10 #4 I & 3x10T,#1 Y 3x10 #4 I & 3x10T,#2 Y 3x10, I 5, T4, Y no weight B/L OH 3x10, I 5, T4, Y no weight B/L OH thumb up, & palm down B/L OH thumb up, & palm down B/L OH thumb up, & palm down  3x10 B/L OH thumb up, & palm down  3x10 B/L OH thumb up, & palm down  3x10    Elbow active ROM 3x10 #5 3x10 #10 3x10 #10 HEP         IR behind the back             TB IR R double 3x10 blue double 3x10           TB ER Single R 3x10 Single G 3x10           TB rows GTB 3x10 blue TB 3x10           TB extension  GTB 3x10 blue TB 3x10           Prone row 3x10 #5lbs 3x10 #5lbs 3x10 #10lbs 3x10 #15lbs 3x10 #15lbs        SL ER  #4 3x10 #4 3x10 #5 3x10 #5 3x10         body blade   30s x2 30s x2 @ elbow bent & straight  3x30s at 90deg, elbow straigh, OH 3x30s at 90deg, elbow straigh, OH 3x30s at 90deg, elbow straigh, OH 3x30s at 90deg, elbow straigh, OH 3x30s at 90deg, elbow straigh, OH 3x30s at 90deg, elbow straigh, OH    Serratus wall slide   RTB 3x10 c roller RTB 3x10 c roller RTB 3x10 c roller RTB 3x10 c roller RTB 3x10 w/  roller RTB 3x10 w/  roller RTB 3x10 w/  roller    Upward chop    YTB 3x10 YTB 3x10 YTB 3x10 YTB 3x10 YTB  3x10 YTB  3x10 + downward YTB  3x10 + downward    Overhead reaching to cabinet with lift off at end    #4 3x10 #2 3x10 np  #2 3x10 #3 3x10    No money against wall     10s x10 3x10 3x10 gtb GTB  3x10 GTB  3x10 GTB  3x10    End range ext against wall TB at wrist         YTB 3x10 YTB 3x10    DB carry @ 90/90         #5 x30ft x3 laps #5 x30ft x3 laps    Box carrying        #15lbs x3 laps np    Robberies         Gr 3x10 Gr 3x10      Modalities  12/6            MH to shoulder x10'

## 2019-12-24 ENCOUNTER — APPOINTMENT (OUTPATIENT)
Dept: PHYSICAL THERAPY | Facility: CLINIC | Age: 59
End: 2019-12-24
Payer: COMMERCIAL

## 2019-12-27 ENCOUNTER — APPOINTMENT (OUTPATIENT)
Dept: PHYSICAL THERAPY | Facility: CLINIC | Age: 59
End: 2019-12-27
Payer: COMMERCIAL

## 2019-12-30 ENCOUNTER — OFFICE VISIT (OUTPATIENT)
Dept: OBGYN CLINIC | Facility: HOSPITAL | Age: 59
End: 2019-12-30
Payer: COMMERCIAL

## 2019-12-30 VITALS
HEIGHT: 70 IN | HEART RATE: 63 BPM | WEIGHT: 255 LBS | BODY MASS INDEX: 36.51 KG/M2 | DIASTOLIC BLOOD PRESSURE: 97 MMHG | SYSTOLIC BLOOD PRESSURE: 154 MMHG

## 2019-12-30 DIAGNOSIS — Z47.89 AFTERCARE FOLLOWING SURGERY OF THE MUSCULOSKELETAL SYSTEM: Primary | ICD-10-CM

## 2019-12-30 PROCEDURE — 99212 OFFICE O/P EST SF 10 MIN: CPT | Performed by: PHYSICIAN ASSISTANT

## 2019-12-30 RX ORDER — CYCLOSPORINE 0.5 MG/ML
EMULSION OPHTHALMIC
COMMUNITY
Start: 2019-12-04

## 2019-12-30 NOTE — PROGRESS NOTES
Assessment:       1  Aftercare following surgery of the musculoskeletal system          Plan:        Patient is making progress postoperatively  All questions were addressed to the patient's satisfaction  Patient is relocating in the future and will be looking to follow up with physical therapy and floor to continue rehab protocol  He will receive a return to work note for dated 2020  Follow-up will be as needed  Subjective:     Patient ID: Anthony Heller is a 61 y o  male  Chief Complaint:    HPI    Patient presents to the office with follow-up for follow-up status post right shoulder arthroscopy  on 2019  He states he is making progress with physical therapy and finds doing activity of daily living less problematic  Social History     Occupational History    Not on file   Tobacco Use    Smoking status: Former Smoker     Last attempt to quit: 2003     Years since quittin 0    Smokeless tobacco: Never Used   Substance and Sexual Activity    Alcohol use: Not Currently     Drinks per session: 1 or 2     Comment: rarely    Drug use: No    Sexual activity: Not on file      Review of Systems   Constitutional: Negative  Respiratory: Negative  Musculoskeletal: Positive for myalgias  Negative for arthralgias  Skin: Negative for wound  Neurological: Positive for weakness  Negative for numbness  Psychiatric/Behavioral: Negative  Objective:     Ortho ExamPhysical Exam   Constitutional: He is oriented to person, place, and time  He appears well-developed and well-nourished  HENT:   Head: Atraumatic  Cardiovascular: Intact distal pulses  Pulmonary/Chest: Effort normal    Musculoskeletal:   Active range of motion right shoulder: Forward flexion 150°, and abduction 80°, external rotation 90°, internal rotation to lumbar spine  Neurological: He is alert and oriented to person, place, and time  No sensory deficit  Skin: Skin is warm and dry   Capillary refill takes less than 2 seconds  Incision dry and clean   Psychiatric: He has a normal mood and affect   His behavior is normal

## 2019-12-30 NOTE — LETTER
December 30, 2019     Patient: Allan Rice   YOB: 1960   Date of Visit: 12/30/2019       To Whom it May Concern:    Davy Byrd is under my professional care  He had right shoulder arthroscopy on 09/11/2019  He was seen in my office on 12/30/2019  He may return to work on 01/01/2020       If you have any questions or concerns, please don't hesitate to call           Sincerely,          Navin Meeks PA-C        CC: No Recipients

## 2019-12-30 NOTE — PATIENT INSTRUCTIONS
1  Continue PT per protocol  2   Follow-up in 2 months, as needed if symptoms worsen or fail to improve

## 2019-12-31 ENCOUNTER — APPOINTMENT (OUTPATIENT)
Dept: PHYSICAL THERAPY | Facility: CLINIC | Age: 59
End: 2019-12-31
Payer: COMMERCIAL

## 2020-01-30 NOTE — PROGRESS NOTES
He did not present for formal d/c   Patient has met their goals for PT, improved FOTO scores, and has a verbal report of improvement   he will be d/c to a home program

## 2021-09-17 NOTE — PROGRESS NOTES
Daily Note     Today's date: 10/22/2018  Patient name: Guillaume Lamas  : 1960  MRN: [id number]  Referring provider: Meri Nugent MD  Dx:   Encounter Diagnosis     ICD-10-CM    1  Biceps tendonitis on right M75 21    2  Incomplete tear of right rotator cuff M75 111                   Subjective: Pt reported feeling good having a lot less pain since performing exercises and no pain at work  Pt     Objective: See treatment diary below      Assessment: Continued with treatment program increase to 5# with prone exercises with no increase in pain  Moderate difficulty with Body blade 90 degrees of flexion and ABd  Pt reported feeling like muscles were firing  Patient demonstrated fatigue post treatment and would benefit from continued PT      Plan: Progress treatment as tolerated  Continue progressing patient       Precautions Anxiety, HTN     Specialty Daily Treatment Diary      Manual                                                                                          Exercise Diary  10/12  10-15  10/17  10/22     UBE  F/R    3'/3'  3'/3'  5'5'     Webslide rows H,M,L      M PTB x30  PTB x30     T-band shoulder IR BTB x 20  BTB x 30  PTB x30  PTB x30     Tband shoulder ER BTB x 20  BTB x 30  PTB 30x  PTB 30x     T-band shoulder extension BTB x 20  BTB x 30  PTB x30  PTB x30     D2 flexion diagnols             Body blade S/I at 90 deg flexion and abd      NV?  30" x2     Shoulder 3 ways             Prone I,Y,T    2 x 10 ea motion  2x10 3# for T  2x10 5#     Prone rows    2 x 10 3#  2x10 3#  2x 10 5#     S/L shoulder ER    2 x 10 3#  2x10 3#  2x10 5#     S/L sleeper stretch    3 x 30"   3x 30"  3x 30"     Corner pec stretch    3 x 30"    3x 30" Visual cues     Pt education Body mechanics and posture                                                                                                     Modalities  10/17           CP prn right shoulder  np                                         Fluconazole Counseling:  Patient counseled regarding adverse effects of fluconazole including but not limited to headache, diarrhea, nausea, upset stomach, liver function test abnormalities, taste disturbance, and stomach pain.  There is a rare possibility of liver failure that can occur when taking fluconazole.  The patient understands that monitoring of LFTs and kidney function test may be required, especially at baseline. The patient verbalized understanding of the proper use and possible adverse effects of fluconazole.  All of the patient's questions and concerns were addressed.

## 2022-06-30 ENCOUNTER — TELEPHONE (OUTPATIENT)
Dept: GASTROENTEROLOGY | Facility: CLINIC | Age: 62
End: 2022-06-30

## 2022-06-30 NOTE — TELEPHONE ENCOUNTER
Dr Sakina Rocha - patient's wife, Tiffany Fierro, called for a copy of patient's colonoscopy be sent to new doctor in [place]     Dr Aleksandr Sommers and [facility]  Fax [phone]  Phone - [phone]

## 2023-02-14 NOTE — LETTER
Child awakened,watson,rejected oral airway,oriented/reassured her,respirations full-regular-deep-clear, she is crying but calms with nurse holding and rocking her, her pacifier does also calm her.   December 30, 2019     Patient: Renetta Nam   YOB: 1960   Date of Visit: 12/30/2019       To Whom it May Concern:    Shanika Barrios is under my professional care  He had right shoulder arthroscopy with rotator cuff repair and subacromial decompression 09/11/2019  He was seen in my office on 12/30/2019  He may return to work on 01/01/2020 and may return to work with limitations Plan no lifting, pulling, or pushing greater than 20 lb with his right upper extremity       If you have any questions or concerns, please don't hesitate to call  Sincerely,          Moi Meza PA-C        CC: Marco Samuel

## 2023-08-16 ENCOUNTER — TELEPHONE (OUTPATIENT)
Age: 63
End: 2023-08-16

## 2023-08-16 NOTE — TELEPHONE ENCOUNTER
Caller: Mahesh w/ [facility]     Doctor/Office: n/    CB#: n/a      What needs to be faxed: needed op note as patient moved to [facility] and is seeing provider now     ATTN to: Oli Saldivar were successfully e-faxed

## 2024-02-02 ENCOUNTER — APPOINTMENT (RX ONLY)
Dept: URBAN - METROPOLITAN AREA CLINIC 79 | Facility: CLINIC | Age: 64
Setting detail: DERMATOLOGY
End: 2024-02-02

## 2024-02-02 DIAGNOSIS — L82.1 OTHER SEBORRHEIC KERATOSIS: ICD-10-CM

## 2024-02-02 DIAGNOSIS — L81.4 OTHER MELANIN HYPERPIGMENTATION: ICD-10-CM

## 2024-02-02 PROBLEM — D23.71 OTHER BENIGN NEOPLASM OF SKIN OF RIGHT LOWER LIMB, INCLUDING HIP: Status: ACTIVE | Noted: 2024-02-02

## 2024-02-02 PROCEDURE — ? OBSERVATION

## 2024-02-02 PROCEDURE — 99202 OFFICE O/P NEW SF 15 MIN: CPT

## 2024-02-02 PROCEDURE — ? COUNSELING

## 2024-02-02 ASSESSMENT — LOCATION ZONE DERM
LOCATION ZONE: TRUNK
LOCATION ZONE: LIP
LOCATION ZONE: LIP

## 2024-02-02 ASSESSMENT — LOCATION SIMPLE DESCRIPTION DERM
LOCATION SIMPLE: LEFT INFERIOR LIP
LOCATION SIMPLE: ABDOMEN
LOCATION SIMPLE: RIGHT UPPER BACK
LOCATION SIMPLE: LEFT INFERIOR LIP

## 2024-02-02 ASSESSMENT — LOCATION DETAILED DESCRIPTION DERM
LOCATION DETAILED: EPIGASTRIC SKIN
LOCATION DETAILED: LEFT INFERIOR VERMILION LIP
LOCATION DETAILED: LEFT INFERIOR VERMILION LIP
LOCATION DETAILED: RIGHT SUPERIOR UPPER BACK

## 2024-02-02 ASSESSMENT — PAIN INTENSITY VAS: HOW INTENSE IS YOUR PAIN 0 BEING NO PAIN, 10 BEING THE MOST SEVERE PAIN POSSIBLE?: NO PAIN

## 2024-12-16 NOTE — TELEPHONE ENCOUNTER
SBIRT complete, screening was negative.  Pt admitted after a fall at home.  Pt admits to frequent falls.  She states she lives with her , two sons, sons girlfriend and 3 grandchildren.  She uses a walker to ambulate but states she had to take a few steps to get her walker and fell. Pt denies alcohol use.  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  She also has a history of depression and is seen once a month at [facility].  Pt denies current concerns with her depression and denies SI.        Alcohol Screening and Brief Intervention        No results for input(s): \"ALC\" in the last 72 hours.    Alcohol Pre-screening     (WOMEN ONLY) How many times in the past year have you had 4 or more drinks in a day?: None       Drug Pre-Screening        Drug Screening DAST  TOTAL SCORE:: 1    Mood Pre-Screening (PHQ-2)  During the past 2 weeks, have you been bothered by, feeling down, depressed or hopeless?  No        I have interviewed Lupe TINOCO [place], [id number] regarding  Her alcohol consumption/drug use and risk for excessive use. Screenings were negative.  Patient  N/A intervention at this time.     Deferred []    Completed on: 12/16/2024   CRISTIAN Motta   We are treating his shoulder  We injected his shoulder  MRI is of the shoulder  We are not treating his neck

## (undated) DEVICE — PACK PBDS SHOULDER ARTHROSCOPY RF

## (undated) DEVICE — CHLORAPREP HI-LITE 26ML ORANGE

## (undated) DEVICE — GLOVE INDICATOR PI UNDERGLOVE SZ 7.5 BLUE

## (undated) DEVICE — GLOVE SRG BIOGEL 7.5

## (undated) DEVICE — GLOVE SRG BIOGEL ECLIPSE 7

## (undated) DEVICE — SHOULDER SUSPENSION KIT 6 PER BOX

## (undated) DEVICE — THREADED CLEAR CANNULA WITH OBTURATOR 8.5MM X 75MM

## (undated) DEVICE — 3M™ IOBAN™ 2 ANTIMICROBIAL INCISE DRAPE 6650EZ: Brand: IOBAN™ 2

## (undated) DEVICE — DISPOSABLE EQUIPMENT COVER: Brand: SMALL TOWEL DRAPE

## (undated) DEVICE — DRESSING MEPILEX AG BORDER 4 X 4 IN

## (undated) DEVICE — LIGHT HANDLE COVER SLEEVE DISP BLUE STELLAR

## (undated) DEVICE — TUBING SUCTION 5MM X 12 FT

## (undated) DEVICE — VAPR COOLPULSE 90 ELECTRODE 90 DEGREES SUCTION WITH INTEGRATED HANDPIECE: Brand: VAPR COOLPULSE

## (undated) DEVICE — SUT MONOCRYL 4-0 PS-2 18 IN Y496G

## (undated) DEVICE — INTENDED FOR TISSUE SEPARATION, AND OTHER PROCEDURES THAT REQUIRE A SHARP SURGICAL BLADE TO PUNCTURE OR CUT.: Brand: BARD-PARKER SAFETY BLADES SIZE 11, STERILE

## (undated) DEVICE — THREADED CLEAR CANNULA WITH OBTURATOR 7MM X 75MM

## (undated) DEVICE — BLADE SHAVER DISSECTOR 4MM 13CM COOLCUT

## (undated) DEVICE — SPONGE PVP SCRUB WING STERILE

## (undated) DEVICE — ADHESIVE SKIN HIGH VISCOSITY EXOFIN 1ML

## (undated) DEVICE — ADHESIVE SKN CLSR HISTOACRYL FLEX 0.5ML LF